# Patient Record
Sex: FEMALE | Race: WHITE | NOT HISPANIC OR LATINO | Employment: FULL TIME | URBAN - METROPOLITAN AREA
[De-identification: names, ages, dates, MRNs, and addresses within clinical notes are randomized per-mention and may not be internally consistent; named-entity substitution may affect disease eponyms.]

---

## 2017-01-16 ENCOUNTER — TELEPHONE (OUTPATIENT)
Dept: OBSTETRICS AND GYNECOLOGY | Facility: CLINIC | Age: 32
End: 2017-01-16

## 2017-01-16 NOTE — TELEPHONE ENCOUNTER
----- Message from Bailey Cruz MA sent at 1/16/2017  9:44 AM CST -----  Contact: pt   Do you have this IUD that was ordered 12/14/2016 for Alice?  ----- Message -----     From: Brittany Santiago     Sent: 1/16/2017   9:40 AM       To: [facility]ADD[facility] Staff    X_  1st Request  _  2nd Request  _  3rd Request    Who:CHARBEL DAVE [[id number]]    Why: Patient states she would like a call back to verify her medical records have been received     What Number to Call Back: [phone]    When to Expect a call back: (Before the end of the day)   -- if call after 3:00 call back will be tomorrow.

## 2017-01-18 ENCOUNTER — TELEPHONE (OUTPATIENT)
Dept: OBSTETRICS AND GYNECOLOGY | Facility: CLINIC | Age: 32
End: 2017-01-18

## 2017-01-18 NOTE — TELEPHONE ENCOUNTER
----- Message from Brittany Jack sent at 1/18/2017  9:24 AM CST -----  Contact: pt  _  1st Request  _  2nd Request  X_  3rd Request        Who: CHARBEL DAVE [[id number]]    Why: Patient states she would like to verify her records have been received. Patient also would like to check the status of her IUD order.    What Number to Call Back: [phone]    When to Expect a call back: (Before the end of the day)   -- if call after 3:00 call back will be tomorrow.    CARLEY

## 2017-01-30 DIAGNOSIS — B00.1 FEVER BLISTER: ICD-10-CM

## 2017-01-31 RX ORDER — VALACYCLOVIR HYDROCHLORIDE 1 G/1
TABLET, FILM COATED ORAL
Qty: 90 TABLET | Refills: 3 | Status: SHIPPED | OUTPATIENT
Start: 2017-01-31 | End: 2018-06-19 | Stop reason: SDUPTHER

## 2017-04-12 ENCOUNTER — TELEPHONE (OUTPATIENT)
Dept: OBSTETRICS AND GYNECOLOGY | Facility: CLINIC | Age: 32
End: 2017-04-12

## 2017-04-12 NOTE — TELEPHONE ENCOUNTER
----- Message from Jennifer Lombardi sent at 4/12/2017 10:15 AM CDT -----  Contact: Self  Pt is calling in regards of seeing if her IUD has came in and if so, she would like to schedule an appt. The pt can be reached at [phone]. Thanks KG

## 2017-04-27 ENCOUNTER — PATIENT MESSAGE (OUTPATIENT)
Dept: FAMILY MEDICINE | Facility: CLINIC | Age: 32
End: 2017-04-27

## 2017-05-09 ENCOUNTER — TELEPHONE (OUTPATIENT)
Dept: OBSTETRICS AND GYNECOLOGY | Facility: CLINIC | Age: 32
End: 2017-05-09

## 2017-05-09 NOTE — TELEPHONE ENCOUNTER
----- Message from Gin Badillo sent at 5/9/2017 10:12 AM CDT -----  Contact: pt   _ X 1st Request  _  2nd Request  _  3rd Request        Who: CHARBEL DAVE [[id number]]X    Why: pt is calling in regards to an IUD removal and replacement     What Number to Call Back: [phone]    When to Expect a call back: (Before the end of the day)   -- if the call is after 12:00, the call back will be tomorrow.        Call sent to Eva Banks MA. Elaine Robison NP

## 2017-05-24 ENCOUNTER — OFFICE VISIT (OUTPATIENT)
Dept: FAMILY MEDICINE | Facility: CLINIC | Age: 32
End: 2017-05-24
Payer: COMMERCIAL

## 2017-05-24 VITALS
HEIGHT: 66 IN | BODY MASS INDEX: 24.34 KG/M2 | DIASTOLIC BLOOD PRESSURE: 72 MMHG | HEART RATE: 60 BPM | WEIGHT: 151.44 LBS | TEMPERATURE: 98 F | SYSTOLIC BLOOD PRESSURE: 106 MMHG

## 2017-05-24 DIAGNOSIS — B35.4 TINEA CORPORIS: Primary | ICD-10-CM

## 2017-05-24 PROCEDURE — 99999 PR PBB SHADOW E&M-EST. PATIENT-LVL III: CPT | Mod: PBBFAC,,, | Performed by: FAMILY MEDICINE

## 2017-05-24 PROCEDURE — 99212 OFFICE O/P EST SF 10 MIN: CPT | Mod: S$GLB,,, | Performed by: FAMILY MEDICINE

## 2017-05-24 PROCEDURE — 1160F RVW MEDS BY RX/DR IN RCRD: CPT | Mod: S$GLB,,, | Performed by: FAMILY MEDICINE

## 2017-05-24 NOTE — PROGRESS NOTES
"Subjective:      Patient ID: Shannan Gayle is a 31 y.o. female.    Chief Complaint: Rash (back of left thigh)    Here today for a rash that developed on the left side of her leg for over a week, itchy.  She has tried over-the-counter topical antibiotic and steroid, but not much better.  She did have ringworm in the past but this looks different.  No drainage.  She does ride horses    Current Outpatient Prescriptions on File Prior to Visit   Medication Sig    valacyclovir (VALTREX) 1000 MG tablet 2 tabs by mouth every 12 hours for 3-5 days for fever blisters     No current facility-administered medications on file prior to visit.      Past Medical History:   Diagnosis Date    Fever blister 08/17/2016    Derm/Dr. Edgar Rosenberg 8/17/2016     No past surgical history on file.  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, single, no children, nonsmoker, ETOH socially, GYN here, enjoys intense cardiac exercise daily, running     Family History   Problem Relation Age of Onset    Autoimmune disease Father      in [place]    Hypertension Father     No Known Problems Mother     No Known Problems Brother     Colon cancer Neg Hx     Breast cancer Neg Hx      Vitals:    05/24/17 1440   BP: 106/72   Pulse: 60   Temp: 98 °F (36.7 °C)   Weight: 68.7 kg (151 lb 7.3 oz)   Height: 5' 6" (1.676 m)   PainSc:   2     Objective:   Physical Exam   Skin:   Circular rash on left lateral upper leg, elevated edges some flaking, pinkish in color, no drainage, nontender     Assessment:     1. Tinea corporis      Plan:          Patient Instructions   -----------------------  LAMISIL OR LOTRIMIN OVER THE COUNTER TWICE A DAY FOR SEVERAL WEEKS, can cover with bandaid for medicine to soak in     If not better, let me know &  consider referral to " Dermatologist  ------------------------        Answers for HPI/ROS submitted by the patient on 5/19/2017   Rash  Chronicity: new  Onset: in the past 7 days  Progression since onset: unchanged  Affected locations: left upper leg  Characteristics: itchiness  Exposed to: nothing  anorexia: No  congestion: No  cough: No  diarrhea: No  eye pain: No  facial edema: No  fatigue: No  fever: No  joint pain: No  nail changes: No  rhinorrhea: No  shortness of breath: No  sore throat: No  vomiting: No  Treatments tried: anti-itch cream, antibiotic cream, moisturizer  Improvement on treatment: mild  asthma: No  allergies: No  eczema: No  varicella: No

## 2017-05-24 NOTE — PATIENT INSTRUCTIONS
-----------------------  LAMISIL OR LOTRIMIN OVER THE COUNTER TWICE A DAY FOR SEVERAL WEEKS, can cover with bandaid for medicine to soak in     If not better, let me know &  consider referral to Dermatologist  ------------------------

## 2017-06-26 ENCOUNTER — PROCEDURE VISIT (OUTPATIENT)
Dept: OBSTETRICS AND GYNECOLOGY | Facility: CLINIC | Age: 32
End: 2017-06-26
Payer: COMMERCIAL

## 2017-06-26 VITALS — WEIGHT: 152.13 LBS | BODY MASS INDEX: 24.45 KG/M2 | HEIGHT: 66 IN

## 2017-06-26 DIAGNOSIS — Z30.433 ENCOUNTER FOR REMOVAL AND REINSERTION OF INTRAUTERINE CONTRACEPTIVE DEVICE (IUD): ICD-10-CM

## 2017-06-26 DIAGNOSIS — Z97.5 IUD CONTRACEPTION: Primary | ICD-10-CM

## 2017-06-26 DIAGNOSIS — Z97.5 INTRAUTERINE DEVICE: ICD-10-CM

## 2017-06-26 LAB
B-HCG UR QL: NEGATIVE
CTP QC/QA: YES

## 2017-06-26 PROCEDURE — 58300 INSERT INTRAUTERINE DEVICE: CPT | Mod: S$GLB,,, | Performed by: OBSTETRICS & GYNECOLOGY

## 2017-06-26 PROCEDURE — 88300 SURGICAL PATH GROSS: CPT | Mod: 26,,, | Performed by: PATHOLOGY

## 2017-06-26 PROCEDURE — 58301 REMOVE INTRAUTERINE DEVICE: CPT | Mod: 51,S$GLB,, | Performed by: OBSTETRICS & GYNECOLOGY

## 2017-06-26 PROCEDURE — 81025 URINE PREGNANCY TEST: CPT | Mod: QW,S$GLB,, | Performed by: OBSTETRICS & GYNECOLOGY

## 2017-06-26 PROCEDURE — 88300 SURGICAL PATH GROSS: CPT | Performed by: PATHOLOGY

## 2017-06-26 NOTE — PROGRESS NOTES
IUD PLACEMENT:    Shannan Gayle is a 31 y.o. female No obstetric history on file., who presents for IUD placement.  No LMP recorded..  UPT is negative.    PARAGARD HAD BEEN PLACED AT OUTSIDE PRACTICE, CAUSING ++ CRAMPING AND HEAVY MENSES - PREFERS REPLACEMENT WITH MIRENA IUD    Pre IUD Placement Counseling:  Contraceptive options were reviewed with the patient , and she chooses Mirena.  Her history was reviewed, and there are no contraindications to an IUD.  The procedure was explained, including minimal risks of pain, bleeding, uterine perforation, infection associated with insertion, and spontaneous expulsion within the first two weeks.  The benefits of contraception without systemic side effects and amenorrhea or hypomenorrhea were discussed.  The patient's questions were answered, and she agrees to proceed.  Hospital and device consent (if provided by ) were signed.    A speculum was placed within the vagina and the cervix was visualized.  STRINGS GRASPED WITH UT DRESSING FORCEPS, REMOVED WITHOUT DIFFICULTY, INTACT.  The cervix was cleaned with betadine swabs times three.  The anterior cervical lip was grasped with a single-tooth tenaculum, and the uterus was sounded using sterile technique to a depth of 7cm.  A Mirena IUD was loaded and placed high within the uterine funduswithout dificulty using a sterile technique.  The string was cut 2cm from the external cervical os.  The tenaculum and the speculum was removed.  The patient tolerated the procedure well.    Assessment:  Contraception management with an IUD insertion    Post IUD Insertion counseling:  The patient was counseled to manage post-IUD placement cramping with NSAIDs, Tylenol, or prescription per the medication card.  She was counseled to report excess bleeding, cramping that does not respond to the above medications, or fever greater than 101.0F.  The patient will maintain pelvic rest for the next week and return for a visit to  evaluate her for signs of possible endometritis.  She was instructed to check for IUD presence by feeling for the strings.  She was counseled regarding the need to remove the IUD in 5 years (Mirena) or 10 years (Paragard).  Counseling lasted approximately 15 minutes, and the patient had no questions at the end of her counseling.   '

## 2017-06-28 DIAGNOSIS — Z00.00 ANNUAL PHYSICAL EXAM: Primary | ICD-10-CM

## 2017-06-30 PROBLEM — Z97.5 INTRAUTERINE DEVICE: Status: ACTIVE | Noted: 2017-06-30

## 2017-07-26 ENCOUNTER — OFFICE VISIT (OUTPATIENT)
Dept: OBSTETRICS AND GYNECOLOGY | Facility: CLINIC | Age: 32
End: 2017-07-26
Payer: COMMERCIAL

## 2017-07-26 VITALS
BODY MASS INDEX: 24.1 KG/M2 | WEIGHT: 149.94 LBS | HEIGHT: 66 IN | DIASTOLIC BLOOD PRESSURE: 68 MMHG | SYSTOLIC BLOOD PRESSURE: 110 MMHG

## 2017-07-26 DIAGNOSIS — Z30.431 ENCOUNTER FOR ROUTINE CHECKING OF INTRAUTERINE CONTRACEPTIVE DEVICE (IUD): ICD-10-CM

## 2017-07-26 DIAGNOSIS — Z01.419 VISIT FOR GYNECOLOGIC EXAMINATION: Primary | ICD-10-CM

## 2017-07-26 PROCEDURE — 99999 PR PBB SHADOW E&M-EST. PATIENT-LVL II: CPT | Mod: PBBFAC,,, | Performed by: OBSTETRICS & GYNECOLOGY

## 2017-07-26 PROCEDURE — 88175 CYTOPATH C/V AUTO FLUID REDO: CPT

## 2017-07-26 PROCEDURE — 99385 PREV VISIT NEW AGE 18-39: CPT | Mod: S$GLB,,, | Performed by: OBSTETRICS & GYNECOLOGY

## 2017-07-26 NOTE — PROGRESS NOTES
HISTORY OF PRESENT ILLNESS:    Shannan Gayle is a 32 y.o. female, No obstetric history on file., No LMP recorded.,  presents for a routine exam and has no complaints. Also IUD check, placed 3 weeks ago. PAP SUBMITTED AND DISCUSSED RECENT CHANGES IN SCREENING RECOMMENDATIONS.    BUSY AT WORK,HAS MOVED TO [facility], PLANS MORE SHOWING THIS YEAR AND POSS TAKING A YEAR OFF FROM [email or web address] . .     Past Medical History:   Diagnosis Date    Fever blister 08/17/2016    Derm/Dr. Edgar Rosenberg 8/17/2016       History reviewed. No pertinent surgical history.    MEDICATIONS AND ALLERGIES:      Current Outpatient Prescriptions:     valacyclovir (VALTREX) 1000 MG tablet, 2 tabs by mouth every 12 hours for 3-5 days for fever blisters, Disp: 90 tablet, Rfl: 3    Review of patient's allergies indicates:  No Known Allergies    Family History   Problem Relation Age of Onset    Autoimmune disease Father      in [place]    Hypertension Father     No Known Problems Mother     No Known Problems Brother     Colon cancer Neg Hx     Breast cancer Neg Hx        Social History     Social History    Marital status: Single     Spouse name: N/A    Number of children: N/A    Years of education: N/A     Occupational History    Not on file.     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  Infection History: Denies STDs. Denies PID.  Benign History: Denies uterine fibroids. Denies ovarian cysts. Denies  "endometriosis. Denies other conditions.  Cancer History: Denies cervical cancer. Denies uterine cancer or hyperplasia. Denies ovarian cancer. Denies vulvar cancer or pre-cancer. Denies vaginal cancer or pre-cancer. Denies breast cancer. Denies colon cancer.  Sexual Activity History: Reports currently being sexually active  Menstrual History: Monthly, mild-moderate.  Contraception:IUD    ROS:  GENERAL: No weight changes. No swelling. No fatigue. No fever.  CARDIOVASCULAR: No chest pain. No shortness of breath. No leg cramps.   NEUROLOGICAL: No headaches. No vision changes.  BREASTS: No pain. No lumps. No discharge.  ABDOMEN: No pain. No nausea. No vomiting. No diarrhea. No constipation.  REPRODUCTIVE: No abnormal bleeding.   VULVA: No pain. No lesions. No itching.  VAGINA: No relaxation. No itching. No odor. No discharge. No lesions.  URINARY: No incontinence. No nocturia. No frequency. No dysuria.    /68   Ht 5' 6" (1.676 m)   Wt 68 kg (149 lb 14.6 oz)   BMI 24.20 kg/m²     PE:  APPEARANCE: Well nourished, well developed, in no acute distress.  AFFECT: WNL, alert and oriented x 3.  SKIN: No acne or hirsutism.  NECK: Neck symmetric, without masses or thyromegaly.  NODES: No inguinal, cervical, axillary or femoral lymph node enlargement.  CHEST: Good respiratory effort.   ABDOMEN: Soft. No tenderness or masses. No hepatosplenomegaly. No hernias.  BREASTS: Symmetrical, no skin changes, visible lesions, palpable masses or nipple discharge bilaterally.  PELVIC: External female genitalia without lesions.  Female hair distribution. Adequate perineal body, Normal urethral meatus. Vagina moist and well rugated without lesions or discharge.  No significant cystocele or rectocele present. Cervix pink without lesions, discharge or tenderness, STRING AT THE EXTERNAL OS. Uterus is normal size, regular, mobile and nontender. Adnexa without masses or tenderness.  EXTREMITIES: No edema    PROCEDURES:  Pap    DIAGNOSIS:  1. " Visit for gynecologic examination  Liquid-based pap smear, screening   2. Encounter for routine checking of intrauterine contraceptive device (IUD)         LABS AND TESTS ORDERED:    MEDICATIONS PRESCRIBED:    COUNSELING:   The patient was counseled today on ACS PAP guidelines, with recommendations for yearly pelvic exams unless their uterus, cervix, and ovaries were removed for benign reasons; in that case, examinations every 3-5 years are recommended.  The patient was also counseled regarding monthly breast self-examination, routine STD screening for at-risk populations, prophylactic immunizations for transmitted infections such as  HPV, Pertussis, or Influenza as appropriate, and yearly mammograms when indicated by ACS guidelines.  She was advised to see her primary care physician for all other health maintenance.    FOLLOW-UP with me annually.

## 2017-08-05 ENCOUNTER — PATIENT MESSAGE (OUTPATIENT)
Dept: OBSTETRICS AND GYNECOLOGY | Facility: HOSPITAL | Age: 32
End: 2017-08-05

## 2017-09-22 ENCOUNTER — LAB VISIT (OUTPATIENT)
Dept: LAB | Facility: HOSPITAL | Age: 32
End: 2017-09-22
Attending: FAMILY MEDICINE
Payer: COMMERCIAL

## 2017-09-22 DIAGNOSIS — Z00.00 ANNUAL PHYSICAL EXAM: ICD-10-CM

## 2017-09-22 LAB
ALBUMIN SERPL BCP-MCNC: 4 G/DL
ALP SERPL-CCNC: 68 U/L
ALT SERPL W/O P-5'-P-CCNC: 11 U/L
ANION GAP SERPL CALC-SCNC: 9 MMOL/L
AST SERPL-CCNC: 20 U/L
BASOPHILS # BLD AUTO: 0.02 K/UL
BASOPHILS NFR BLD: 0.3 %
BILIRUB SERPL-MCNC: 0.5 MG/DL
BUN SERPL-MCNC: 14 MG/DL
CALCIUM SERPL-MCNC: 9 MG/DL
CHLORIDE SERPL-SCNC: 107 MMOL/L
CHOLEST SERPL-MCNC: 223 MG/DL
CHOLEST/HDLC SERPL: 3.4 {RATIO}
CO2 SERPL-SCNC: 27 MMOL/L
CREAT SERPL-MCNC: 0.9 MG/DL
DIFFERENTIAL METHOD: NORMAL
EOSINOPHIL # BLD AUTO: 0.1 K/UL
EOSINOPHIL NFR BLD: 2.1 %
ERYTHROCYTE [DISTWIDTH] IN BLOOD BY AUTOMATED COUNT: 13 %
EST. GFR  (AFRICAN AMERICAN): >60 ML/MIN/1.73 M^2
EST. GFR  (NON AFRICAN AMERICAN): >60 ML/MIN/1.73 M^2
GLUCOSE SERPL-MCNC: 97 MG/DL
HCT VFR BLD AUTO: 40 %
HDLC SERPL-MCNC: 65 MG/DL
HDLC SERPL: 29.1 %
HGB BLD-MCNC: 14 G/DL
LDLC SERPL CALC-MCNC: 145 MG/DL
LYMPHOCYTES # BLD AUTO: 2 K/UL
LYMPHOCYTES NFR BLD: 30.1 %
MCH RBC QN AUTO: 29.7 PG
MCHC RBC AUTO-ENTMCNC: 35 G/DL
MCV RBC AUTO: 85 FL
MONOCYTES # BLD AUTO: 0.4 K/UL
MONOCYTES NFR BLD: 6.5 %
NEUTROPHILS # BLD AUTO: 4 K/UL
NEUTROPHILS NFR BLD: 60.7 %
NONHDLC SERPL-MCNC: 158 MG/DL
PLATELET # BLD AUTO: 230 K/UL
PMV BLD AUTO: 10.1 FL
POTASSIUM SERPL-SCNC: 4.2 MMOL/L
PROT SERPL-MCNC: 7.2 G/DL
RBC # BLD AUTO: 4.71 M/UL
SODIUM SERPL-SCNC: 143 MMOL/L
TRIGL SERPL-MCNC: 65 MG/DL
TSH SERPL DL<=0.005 MIU/L-ACNC: 1.68 UIU/ML
WBC # BLD AUTO: 6.58 K/UL

## 2017-09-22 PROCEDURE — 84443 ASSAY THYROID STIM HORMONE: CPT

## 2017-09-22 PROCEDURE — 80061 LIPID PANEL: CPT

## 2017-09-22 PROCEDURE — 36415 COLL VENOUS BLD VENIPUNCTURE: CPT | Mod: PO

## 2017-09-22 PROCEDURE — 85025 COMPLETE CBC W/AUTO DIFF WBC: CPT

## 2017-09-22 PROCEDURE — 80053 COMPREHEN METABOLIC PANEL: CPT

## 2017-09-28 ENCOUNTER — OFFICE VISIT (OUTPATIENT)
Dept: FAMILY MEDICINE | Facility: CLINIC | Age: 32
End: 2017-09-28
Payer: COMMERCIAL

## 2017-09-28 VITALS
HEART RATE: 72 BPM | DIASTOLIC BLOOD PRESSURE: 70 MMHG | SYSTOLIC BLOOD PRESSURE: 89 MMHG | TEMPERATURE: 98 F | BODY MASS INDEX: 23.42 KG/M2 | WEIGHT: 145.75 LBS | HEIGHT: 66 IN

## 2017-09-28 DIAGNOSIS — B00.1 FEVER BLISTER: ICD-10-CM

## 2017-09-28 DIAGNOSIS — Z00.00 ANNUAL PHYSICAL EXAM: Primary | ICD-10-CM

## 2017-09-28 DIAGNOSIS — Z28.39 IMMUNIZATION DEFICIENCY: ICD-10-CM

## 2017-09-28 PROCEDURE — 99999 PR PBB SHADOW E&M-EST. PATIENT-LVL III: CPT | Mod: PBBFAC,,, | Performed by: FAMILY MEDICINE

## 2017-09-28 PROCEDURE — 99395 PREV VISIT EST AGE 18-39: CPT | Mod: S$GLB,,, | Performed by: FAMILY MEDICINE

## 2017-09-28 NOTE — PATIENT INSTRUCTIONS
Due TDAP & FLU        ==============================================================    Follow with GYN    RECOMMENDATIONS FOR FEMALES    Prevent the #1 cause of death- cardiovascular disease (HEART ATTACK & STROKE) by checking for normal blood pressure, cholesterol, sugars, & by not smoking.     VACCINES: Yearly FLU shot,     I recommend  high fiber (5 fresh fruits or vegetables daily), low fat diet and aerobic  exercise (huffing/ puffing/ sweating for 20 min straight at least 4 days a week)    Follow up yearly with  fasting lipids, CMP, CBC, TSH prior.   ==============================================================

## 2017-09-28 NOTE — PROGRESS NOTES
Subjective:      Patient ID: Shannan Gayle is a 32 y.o. female.    Chief Complaint: Annual Exam    Here today for general exam.     She follows with gynecologist for female cancer screening    She enjoys riding horses    No flare ups of fever blisters recently.     Denies any chest pain, shortness of breath, nausea vomiting constipation diarrhea, blood in stool, heartburn      No results found for: HGBA1C  No results found for: MICALBCREAT  Lab Results   Component Value Date    LDLCALC 145.0 09/22/2017    LDLCALC 132.4 10/19/2015    CHOL 223 (H) 09/22/2017    HDL 65 09/22/2017    TRIG 65 09/22/2017       Lab Results   Component Value Date     09/22/2017    K 4.2 09/22/2017     09/22/2017    CO2 27 09/22/2017    GLU 97 09/22/2017    BUN 14 09/22/2017    CREATININE 0.9 09/22/2017    CALCIUM 9.0 09/22/2017    PROT 7.2 09/22/2017    ALBUMIN 4.0 09/22/2017    BILITOT 0.5 09/22/2017    ALKPHOS 68 09/22/2017    AST 20 09/22/2017    ALT 11 09/22/2017    ANIONGAP 9 09/22/2017    ESTGFRAFRICA >60.0 09/22/2017    EGFRNONAA >60.0 09/22/2017    WBC 6.58 09/22/2017    HGB 14.0 09/22/2017    HCT 40.0 09/22/2017    MCV 85 09/22/2017     09/22/2017    TSH 1.679 09/22/2017    [id number] 43 05/31/2013         Current Outpatient Prescriptions on File Prior to Visit   Medication Sig    valacyclovir (VALTREX) 1000 MG tablet 2 tabs by mouth every 12 hours for 3-5 days for fever blisters     No current facility-administered medications on file prior to visit.      Past Medical History:   Diagnosis Date    Fever blister 08/17/2016    Derm/Dr. Edgar Rosenberg 8/17/2016     No past surgical history on file.  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 She appears well-developed and well-nourished.   HENT:   Head: Normocephalic and atraumatic.   Right Ear: External ear normal.   Left Ear: External ear normal.   Nose: Nose normal.   Mouth/Throat: Oropharynx is clear and moist.   Eyes: EOM are normal. Pupils are equal, round, and reactive to light.   Neck: Neck supple. No thyromegaly present.   Cardiovascular: Normal rate, regular rhythm, normal heart sounds and intact distal pulses.    No murmur heard.  Pulmonary/Chest: Effort normal and breath sounds normal. She has no wheezes.   Abdominal: Soft. Bowel sounds are normal. She exhibits no distension and no mass. There is no tenderness. There is no rebound and no guarding.   Musculoskeletal: She exhibits no edema.   Lymphadenopathy:     She has no cervical adenopathy.   Neurological: She is alert and oriented to person, place, and time.   Skin: Skin is warm and dry.   Psychiatric: She has a normal mood and affect. Her behavior is normal.     Assessment:     1. general exam    2. Immunization deficiency    3. Fever blister      Plan:     Orders Placed This Encounter    (In Office Administered) Tdap Vaccine   declines    Patient Instructions   Due TDAP & FLU        ==============================================================    Follow with GYN    RECOMMENDATIONS FOR FEMALES    Prevent the #1 cause of death- cardiovascular disease (HEART ATTACK & STROKE) by checking for normal blood pressure, cholesterol, sugars, & by not smoking.     VACCINES: Yearly FLU shot, "     I recommend  high fiber (5 fresh fruits or vegetables daily), low fat diet and aerobic  exercise (huffing/ puffing/ sweating for 20 min straight at least 4 days a week)    Follow up yearly with  fasting lipids, CMP, CBC, TSH prior.   ==============================================================

## 2017-10-06 ENCOUNTER — PATIENT MESSAGE (OUTPATIENT)
Dept: FAMILY MEDICINE | Facility: CLINIC | Age: 32
End: 2017-10-06

## 2017-10-12 ENCOUNTER — PATIENT MESSAGE (OUTPATIENT)
Dept: FAMILY MEDICINE | Facility: CLINIC | Age: 32
End: 2017-10-12

## 2018-02-08 ENCOUNTER — OFFICE VISIT (OUTPATIENT)
Dept: OBSTETRICS AND GYNECOLOGY | Facility: CLINIC | Age: 33
End: 2018-02-08
Payer: COMMERCIAL

## 2018-02-08 VITALS — WEIGHT: 147 LBS | HEIGHT: 66 IN | BODY MASS INDEX: 23.63 KG/M2

## 2018-02-08 DIAGNOSIS — Z11.3 VENEREAL DISEASE SCREENING: Primary | ICD-10-CM

## 2018-02-08 PROCEDURE — 99213 OFFICE O/P EST LOW 20 MIN: CPT | Mod: S$GLB,,, | Performed by: OBSTETRICS & GYNECOLOGY

## 2018-02-08 PROCEDURE — 87491 CHLMYD TRACH DNA AMP PROBE: CPT

## 2018-02-08 PROCEDURE — 99999 PR PBB SHADOW E&M-EST. PATIENT-LVL II: CPT | Mod: PBBFAC,,, | Performed by: OBSTETRICS & GYNECOLOGY

## 2018-02-08 PROCEDURE — 99212 OFFICE O/P EST SF 10 MIN: CPT | Performed by: OBSTETRICS & GYNECOLOGY

## 2018-02-08 PROCEDURE — 87480 CANDIDA DNA DIR PROBE: CPT

## 2018-02-08 PROCEDURE — 3008F BODY MASS INDEX DOCD: CPT | Mod: S$GLB,,, | Performed by: OBSTETRICS & GYNECOLOGY

## 2018-02-08 NOTE — PROGRESS NOTES
HISTORY OF PRESENT ILLNESS:    Shannan Gayle is a 32 y.o. female, No obstetric history on file., No LMP recorded. Patient is not currently having periods (Reason: Birth Control).,  presents for a problem visit, complaining of NEEDS INFECTION SCREENING FOR PEACE OF MIND    Past Medical History:   Diagnosis Date    Fever blister 08/17/2016    Derm/Dr. Edgar Rosenberg 8/17/2016       History reviewed. No pertinent surgical history.    MEDICATIONS AND ALLERGIES:      Current Outpatient Prescriptions:     valacyclovir (VALTREX) 1000 MG tablet, 2 tabs by mouth every 12 hours for 3-5 days for fever blisters, Disp: 90 tablet, Rfl: 3    Review of patient's allergies indicates:  No Known Allergies    Family History   Problem Relation Age of Onset    Autoimmune disease Father      in [place]    Hypertension Father     No Known Problems Mother     No Known Problems Brother     Colon cancer Neg Hx     Breast cancer Neg Hx        Social History     Social History    Marital status: Single     Spouse name: N/A    Number of children: N/A    Years of education: N/A     Occupational History    Not on file.     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  Infection History: Denies STDs. Denies PID.  Benign History: Denies uterine fibroids. Denies ovarian cysts. Denies endometriosis. Denies other conditions.  Cancer History: Denies cervical cancer. Denies uterine cancer or hyperplasia. Denies ovarian  "cancer. Denies vulvar cancer or pre-cancer. Denies vaginal cancer or pre-cancer. Denies breast cancer. Denies colon cancer.    ROS:  GENERAL: No weight changes. No swelling. No fatigue. No fever.  CARDIOVASCULAR: No chest pain. No shortness of breath. No leg cramps.   NEUROLOGICAL: No headaches. No vision changes.  BREASTS: No pain. No lumps. No discharge.  ABDOMEN: No pain. No nausea. No vomiting. No diarrhea. No constipation.  REPRODUCTIVE: No abnormal bleeding.   VULVA: No pain. No lesions. No itching.  VAGINA: No relaxation. No itching. No odor. No discharge. No lesions.  URINARY: No incontinence. No nocturia. No frequency. No dysuria.    Ht 5' 6" (1.676 m)   Wt 66.7 kg (147 lb)   BMI 23.73 kg/m²     PE:  APPEARANCE: Well nourished, well developed, in no acute distress.  ABDOMEN: Soft. No tenderness or masses. No hepatosplenomegaly. No hernias.  BREASTS, FUNDOSCOPIC, RECTAL DEFERRED  PELVIC: External female genitalia without lesions.  Female hair distribution. Adequate perineal body, Normal urethral meatus. Vagina moist and well rugated without lesions or discharge.  No significant cystocele or rectocele present. Cervix pink without lesions, discharge or tenderness, STRING AT EXTERNAL OS. Uterus is normal size, regular, mobile and nontender. Adnexa without masses or tenderness.  EXTREMITIES: No edema    PROCEDURES:    DIAGNOSIS:  1. Venereal disease screening  HIV-1 and HIV-2 antibodies    RPR    Hepatitis B surface antigen    C. trachomatis/N. gonorrhoeae by AMP DNA Cervicovaginal    Vaginosis Screen by DNA Probe       LABS AND TESTS ORDERED:    MEDICATIONS PRESCRIBED:    COUNSELING:   The patient was counseled on the options for TESTING AND treatment IF NEEDED    FOLLOW-UP with me routine visit.       Answers for HPI/ROS submitted by the patient on 2/6/2018   STD exposure  discharge: No  genital itching: No  genital rash: No  dyspareunia: No  genital lesions: No  pelvic pain: No  dysuria: No  Please select " the characteristics of your discharge: : normal  abdominal pain: No  anorexia: No  diaphoresis: No  fever: No  genital odor: No  rectal pain: No  sore throat: No  frequency: No  treatments tried: nothing

## 2018-02-09 LAB
C TRACH DNA SPEC QL NAA+PROBE: NOT DETECTED
CANDIDA RRNA VAG QL PROBE: NEGATIVE
G VAGINALIS RRNA GENITAL QL PROBE: POSITIVE
N GONORRHOEA DNA SPEC QL NAA+PROBE: NOT DETECTED
T VAGINALIS RRNA GENITAL QL PROBE: NEGATIVE

## 2018-02-11 ENCOUNTER — PATIENT MESSAGE (OUTPATIENT)
Dept: OBSTETRICS AND GYNECOLOGY | Facility: CLINIC | Age: 33
End: 2018-02-11

## 2018-02-11 RX ORDER — METRONIDAZOLE 7.5 MG/G
1 GEL VAGINAL DAILY
Qty: 70 G | Refills: 0 | Status: SHIPPED | OUTPATIENT
Start: 2018-02-11 | End: 2018-02-16

## 2018-02-11 NOTE — TELEPHONE ENCOUNTER
MESSAGE TO PATIENT:  Results of your cervical culture shows bacterial inflammation (BV) within the vagina but no evidence of a transmitted infection.  It will require treatment only if you have symptoms of vaginal irritation or burning, and your partner will not need treatment.  It is associated frequently with exposure to a perfume or additive to soaps, so I recommend you choose unscented products   I will send a prescription for Metrogel to your pharmacy on file.  Please contact me at the office if you have any questions or concerns.

## 2018-04-14 ENCOUNTER — PATIENT MESSAGE (OUTPATIENT)
Dept: FAMILY MEDICINE | Facility: CLINIC | Age: 33
End: 2018-04-14

## 2018-06-13 ENCOUNTER — PATIENT MESSAGE (OUTPATIENT)
Dept: FAMILY MEDICINE | Facility: CLINIC | Age: 33
End: 2018-06-13

## 2018-06-19 DIAGNOSIS — B00.1 FEVER BLISTER: ICD-10-CM

## 2018-06-19 RX ORDER — VALACYCLOVIR HYDROCHLORIDE 1 G/1
TABLET, FILM COATED ORAL
Qty: 90 TABLET | Refills: 0 | Status: SHIPPED | OUTPATIENT
Start: 2018-06-19 | End: 2018-10-02 | Stop reason: SDUPTHER

## 2018-07-03 ENCOUNTER — OFFICE VISIT (OUTPATIENT)
Dept: URGENT CARE | Facility: CLINIC | Age: 33
End: 2018-07-03
Payer: COMMERCIAL

## 2018-07-03 VITALS
WEIGHT: 147 LBS | TEMPERATURE: 98 F | OXYGEN SATURATION: 99 % | SYSTOLIC BLOOD PRESSURE: 116 MMHG | HEART RATE: 81 BPM | HEIGHT: 66 IN | RESPIRATION RATE: 18 BRPM | DIASTOLIC BLOOD PRESSURE: 81 MMHG | BODY MASS INDEX: 23.63 KG/M2
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
## 2018-07-03 DIAGNOSIS — S91.051A DOG BITE OF RIGHT ANKLE, INITIAL ENCOUNTER: ICD-10-CM

## 2018-07-03 DIAGNOSIS — T14.8XXA PUNCTURE WOUND: ICD-10-CM

## 2018-07-03 DIAGNOSIS — S91.011A LACERATION OF RIGHT ANKLE, INITIAL ENCOUNTER: Primary | ICD-10-CM

## 2018-07-03 PROCEDURE — 90715 TDAP VACCINE 7 YRS/> IM: CPT | Mod: S$GLB,,, | Performed by: FAMILY MEDICINE

## 2018-07-03 PROCEDURE — 99203 OFFICE O/P NEW LOW 30 MIN: CPT | Mod: 25,S$GLB,, | Performed by: FAMILY MEDICINE

## 2018-07-03 PROCEDURE — 3008F BODY MASS INDEX DOCD: CPT | Mod: CPTII,S$GLB,, | Performed by: FAMILY MEDICINE

## 2018-07-03 PROCEDURE — 90471 IMMUNIZATION ADMIN: CPT | Mod: S$GLB,,, | Performed by: FAMILY MEDICINE

## 2018-07-03 RX ORDER — MUPIROCIN 20 MG/G
OINTMENT TOPICAL
Qty: 22 G | Refills: 1 | Status: SHIPPED | OUTPATIENT
Start: 2018-07-03 | End: 2020-10-13

## 2018-07-03 RX ORDER — AMOXICILLIN AND CLAVULANATE POTASSIUM 875; 125 MG/1; MG/1
1 TABLET, FILM COATED ORAL 2 TIMES DAILY
Qty: 20 TABLET | Refills: 0 | Status: SHIPPED | OUTPATIENT
Start: 2018-07-03 | End: 2018-07-13

## 2018-07-03 NOTE — PATIENT INSTRUCTIONS
Dog Bite (Child)  Dog bites can cause small puncture wounds or serious injuries. The area may swell and be painful. It may also bleed and seep fluid. Dog bites that reach the bone can cause a fracture. The bites can also damage nerves and blood vessels. An infection from a dog bite is rare, but can cause redness, swelling, pain, and fever. In rare cases, the animal can pass a disease like rabies or tetanus through the bite.  Dog bites are treated by first rinsing the wound with saline or sterile water. The skin is washed with a mild soap and warm water. If needed, the wound is closed with stitches (sutures). A clean pressure dressing may then be applied. A tetanus shot may be needed, especially if the childs last shot was more than 5 years ago. An X-ray may also be needed. If its not known if the dog was vaccinated, rabies protocol may be followed. This involves keeping the dog isolated (quarantined) and giving the child a series of rabies shots. If the wound is severe or infected, a hospital stay may be needed.  An antibiotic cream or ointment or oral antibiotics may be prescribed. These help prevent or treat infection. Follow all instructions when applying or giving this medicine to your child.  Home care  General care  · Wash your hands well with soap and warm water before and after caring for the wound. This helps lower the risk of infection.  · Follow instructions on how to care for the wound. This may involve cleaning the wound with gentle soap and warm water. If a dressing was applied to the wound, be sure to change it as directed.  · If the wound bleeds, place a clean, soft cloth on the wound. Then firmly apply pressure until the bleeding stops. This may take up to 5 minutes. Do not release the pressure and look at the wound during this time.  · Check the wound daily for signs of infection (see section below on seeking medical advice).  Prevention  Dogs usually dont bite unless they are teased or  threatened. At times, dogs bite during play. Small children are easy targets for dog bites. They move quickly and unpredictably. Also, children often dont know how to be gentle with animals.  · Keep babies away from all pets. Even a friendly dog may not understand that a baby is not a toy or prey.  · Teach your child how to treat animals gently and with respect. This includes not approaching strange dogs or teasing dogs. Have your child ask the owner for permission before petting a strange dog.  · Teach your child to never bother a dog that is eating, sleeping, or caring for puppies.  · If you are thinking about getting a family pet, get advice from a vet about breeds that are best with children.  · If you bring a dog into your home, train the dog to be obedient and listen to commands. You can have older children help with the training.  Follow-up care  Follow up with your childs healthcare provider, or as advised.  When to seek medical advice  Call your childs healthcare provider right away if your child has any of the following:  · A fever of 100.4°F (38°C) or higher, or as directed by the provider  · Signs of infection around the wound, such as warmth, redness, swelling, or foul-smelling drainage.  · Pain that gets worse. Babies may show pain as crying or fussing that cant be soothed.  · Bleeding that doesnt stop after 5 minutes of firm pressure.  · Trouble moving any body part near the wound.  Date Last Reviewed: 3/1/2017  © 8040-3350 [employer]. [address] [place] [address], [place] [address]. All rights reserved. This information is not intended as a substitute for professional medical care. Always follow your healthcare professional's instructions.

## 2018-07-03 NOTE — PROGRESS NOTES
"Subjective:       Patient ID: Shannan Gayle is a 32 y.o. female.    Vitals:  height is 5' 6" (1.676 m) and weight is 66.7 kg (147 lb). Her oral temperature is 97.9 °F (36.6 °C). Her blood pressure is 116/81 and her pulse is 81. Her respiration is 18 and oxygen saturation is 99%.     Chief Complaint: Puncture Wound    Ankle Injury    The incident occurred less than 1 hour ago. [place] Injury mechanism: puncture wound from dog tooth. The pain is present in the right ankle. The pain is at a severity of 0/10. The patient is experiencing no pain. Pertinent negatives include no numbness. She reports no foreign bodies present. She has tried nothing for the symptoms.     Review of Systems   Constitution: Negative for weakness and malaise/fatigue.   HENT: Negative for nosebleeds.    Cardiovascular: Negative for chest pain and syncope.   Respiratory: Negative for shortness of breath.    Musculoskeletal: Negative for back pain, joint pain and neck pain.   Gastrointestinal: Negative for abdominal pain.   Genitourinary: Negative for hematuria.   Neurological: Negative for dizziness and numbness.       Objective:      Physical Exam   Constitutional: She is oriented to person, place, and time. She appears well-developed and well-nourished. She is cooperative.  Non-toxic appearance. She does not appear ill. No distress.   HENT:   Head: Normocephalic and atraumatic. Head is without abrasion, without contusion and without laceration.   Right Ear: Hearing, tympanic membrane and ear canal normal. No hemotympanum.   Left Ear: Hearing, tympanic membrane and ear canal normal. No hemotympanum.   Nose: Nose normal. No mucosal edema, rhinorrhea or nasal deformity. No epistaxis. Right sinus exhibits no maxillary sinus tenderness and no frontal sinus tenderness. Left sinus exhibits no maxillary sinus tenderness and no frontal sinus tenderness.   Mouth/Throat: Uvula is midline and mucous membranes are normal. No " trismus in the jaw. Normal dentition. No uvula swelling. No posterior oropharyngeal erythema.   Eyes: Conjunctivae, EOM and lids are normal. Right eye exhibits no discharge. Left eye exhibits no discharge. No scleral icterus.   Sclera clear bilat   Neck: Trachea normal, normal range of motion, full passive range of motion without pain and phonation normal. Neck supple. No spinous process tenderness and no muscular tenderness present. No neck rigidity. No tracheal deviation present.   Cardiovascular: Normal rate, intact distal pulses and normal pulses.    Pulmonary/Chest: Effort normal. No respiratory distress.   Abdominal: Normal appearance. She exhibits no distension, no pulsatile midline mass and no mass. There is no tenderness.   Musculoskeletal: Normal range of motion. She exhibits no edema or deformity.   Neurological: She is alert and oriented to person, place, and time. She has normal strength. No cranial nerve deficit or sensory deficit. She exhibits normal muscle tone. She displays no seizure activity. Coordination normal. GCS eye subscore is 4. GCS verbal subscore is 5. GCS motor subscore is 6.   Skin: Skin is warm and dry. Capillary refill takes less than 2 seconds. Laceration (smal 0.5cm lac with subcutaneous tissue visible) noted. No abrasion, no bruising, no burn and no ecchymosis noted. She is not diaphoretic. No pallor.        Psychiatric: She has a normal mood and affect. Her speech is normal and behavior is normal. Judgment and thought content normal. Cognition and memory are normal.   Nursing note and vitals reviewed.      Assessment:       1. Laceration of right ankle, initial encounter    2. Puncture wound    3. Dog bite of right ankle, initial encounter        Plan:         Laceration of right ankle, initial encounter    Puncture wound    Dog bite of right ankle, initial encounter    Other orders  -     (In Office Administered) Tdap Vaccine  -     amoxicillin-clavulanate 875-125mg (AUGMENTIN)  875-125 mg per tablet; Take 1 tablet by mouth 2 (two) times daily. for 10 days  Dispense: 20 tablet; Refill: 0  -     mupirocin (BACTROBAN) 2 % ointment; Apply to affected area 3 times daily  Dispense: 22 g; Refill: 1

## 2018-07-03 NOTE — PROCEDURES
Excision of Lesion  Date/Time: 7/3/2018 5:28 PM  Performed by: AMY GIBBONS  Authorized by: AMY GIBOBNS       ANESTHESIA:  Local anesthetic:  Topical anesthetic    PROCEDURE DETAILS:  Excision type:  Skin  Malignancy:  Benign   Removed protruding subcutaneous fat from wound. With scissors. Hemostasis achieved. Wound clean vigorously with betadine/saline

## 2018-07-06 ENCOUNTER — TELEPHONE (OUTPATIENT)
Dept: URGENT CARE | Facility: CLINIC | Age: 33
End: 2018-07-06

## 2018-07-09 DIAGNOSIS — Z00.00 ANNUAL PHYSICAL EXAM: Primary | ICD-10-CM

## 2018-07-16 ENCOUNTER — OFFICE VISIT (OUTPATIENT)
Dept: OBSTETRICS AND GYNECOLOGY | Facility: CLINIC | Age: 33
End: 2018-07-16
Payer: COMMERCIAL

## 2018-07-16 VITALS — BODY MASS INDEX: 23.03 KG/M2 | HEIGHT: 66 IN | WEIGHT: 143.31 LBS

## 2018-07-16 DIAGNOSIS — Z01.419 VISIT FOR GYNECOLOGIC EXAMINATION: Primary | ICD-10-CM

## 2018-07-16 DIAGNOSIS — Z30.431 ENCOUNTER FOR ROUTINE CHECKING OF INTRAUTERINE CONTRACEPTIVE DEVICE (IUD): ICD-10-CM

## 2018-07-16 DIAGNOSIS — L72.9 FOLLICULAR CYST OF THE SKIN AND SUBCUTANEOUS TISSUE, UNSPECIFIED: ICD-10-CM

## 2018-07-16 PROCEDURE — 99395 PREV VISIT EST AGE 18-39: CPT | Mod: S$GLB,,, | Performed by: OBSTETRICS & GYNECOLOGY

## 2018-07-16 PROCEDURE — 99999 PR PBB SHADOW E&M-EST. PATIENT-LVL II: CPT | Mod: PBBFAC,,, | Performed by: OBSTETRICS & GYNECOLOGY

## 2018-07-16 NOTE — PROGRESS NOTES
HISTORY OF PRESENT ILLNESS:    Shannan Gayle is a 33 y.o. female, No obstetric history on file., Patient's last menstrual period was 06/15/2018.,  presents for a routine exam and has no complaints OTHER THAN VULVAR PAIN - HAS TWO SMALL DERMAL CYSTS THAT ARE MORE INFLAMED WITH EXERCISE IN THE HEAT; HAS BEEN SHAVING BUT TRYING TO LIMIT FREQUENCY.  ON EXAM TINY 1 CM AND .5 CM DEEP DERMAL CYSTS LEFT ANTERIOR VULVA, NO CELLULITIS OR ERYTHEMA.  COUNSELED RE LOCAL CARE; WOULD NOT RECOMMEND I&D UNLESS THEY PROGRESS SYMPTOMATICALLY OR PERSIST EXCESSIVE TIME.  PAP DUE 2020    LAST ROUTINE VISIT 7/2017:  PAP SUBMITTED AND DISCUSSED RECENT CHANGES IN SCREENING RECOMMENDATIONS.    BUSY AT WORK,HAS MOVED TO [facility], PLANS MORE SHOWING THIS YEAR AND POSS TAKING A YEAR OFF FROM [employer] .     Past Medical History:   Diagnosis Date    Fever blister 08/17/2016    Derm/Dr. Edgar Rosenberg 8/17/2016       History reviewed. No pertinent surgical history.    MEDICATIONS AND ALLERGIES:      Current Outpatient Prescriptions:     mupirocin (BACTROBAN) 2 % ointment, Apply to affected area 3 times daily, Disp: 22 g, Rfl: 1    valACYclovir (VALTREX) 1000 MG tablet, 2 TABS BY MOUTH EVERY 12 HOURS FOR 3-5 DAYS FOR FEVER BLISTERS, Disp: 90 tablet, Rfl: 0    Review of patient's allergies indicates:  No Known Allergies    Family History   Problem Relation Age of Onset    Autoimmune disease [place]    Hypertension Father     No Known Problems Mother     No Known Problems Brother     Colon cancer Neg Hx     Breast cancer Neg Hx        Social History     Social History    Marital status: Single     Spouse name: N/A    Number of children: N/A    Years of education: N/A     Occupational History    Not on file.     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  Infection History: Denies STDs. Denies PID.  Benign History: Denies uterine fibroids. Denies ovarian cysts. Denies endometriosis. Denies other conditions.  Cancer History: Denies cervical cancer. Denies uterine cancer or hyperplasia. Denies ovarian cancer. Denies vulvar cancer or pre-cancer. Denies vaginal cancer or pre-cancer. Denies breast cancer. Denies colon cancer.  Sexual Activity History: Reports currently being sexually active  Menstrual History: Monthly, mild-moderate.  Contraception:IUD    ROS:  GENERAL: No weight changes. No swelling. No fatigue. No fever.  CARDIOVASCULAR: No chest pain. No shortness of breath. No leg cramps.   NEUROLOGICAL: No headaches. No vision changes.  BREASTS: No pain. No lumps. No discharge.  ABDOMEN: No pain. No nausea. No vomiting. No diarrhea. No constipation.  REPRODUCTIVE: No abnormal bleeding.   VULVA: No pain. No lesions. No itching.  VAGINA: No relaxation. No itching. No odor. No discharge. No lesions.  URINARY: No incontinence. No nocturia. No frequency. No dysuria.    Ht 5' 6" (1.676 m)   Wt 65 kg (143 lb 4.8 oz)   LMP 06/15/2018   BMI 23.13 kg/m²     PE:  APPEARANCE: Well nourished, well developed, in no acute distress.  AFFECT: WNL, alert and oriented x 3.  SKIN: No acne or hirsutism.  NECK: Neck symmetric, without masses or thyromegaly.  NODES: No inguinal, cervical, axillary or femoral lymph node enlargement.  CHEST: Good respiratory effort.   ABDOMEN: Soft. No tenderness or masses. No hepatosplenomegaly. No hernias.  BREASTS: Symmetrical, no skin changes, visible lesions, palpable masses or " nipple discharge bilaterally.  PELVIC: External female genitalia with 2 SMALL DERMAL CYSTS AS ABOVE.  Female hair distribution. Adequate perineal body, Normal urethral meatus. Vagina moist and well rugated without lesions or discharge.  No significant cystocele or rectocele present. Cervix pink without lesions, discharge or tenderness, STRING AT EXTERNAL OS. Uterus is normal size, regular, mobile and nontender. Adnexa without masses or tenderness.  EXTREMITIES: No edema    PROCEDURES:      DIAGNOSIS:  1. Visit for gynecologic examination     2. Encounter for routine checking of intrauterine contraceptive device (IUD)     3. Follicular cyst of the skin and subcutaneous tissue, unspecified         LABS AND TESTS ORDERED:    MEDICATIONS PRESCRIBED:    COUNSELING:   The patient was counseled today on LOCAL CARE.    FOLLOW-UP with me annually.

## 2018-09-26 ENCOUNTER — LAB VISIT (OUTPATIENT)
Dept: LAB | Facility: HOSPITAL | Age: 33
End: 2018-09-26
Attending: FAMILY MEDICINE
Payer: COMMERCIAL

## 2018-09-26 DIAGNOSIS — Z00.00 ANNUAL PHYSICAL EXAM: ICD-10-CM

## 2018-09-26 LAB
ALBUMIN SERPL BCP-MCNC: 4.2 G/DL
ALP SERPL-CCNC: 58 U/L
ALT SERPL W/O P-5'-P-CCNC: 13 U/L
ANION GAP SERPL CALC-SCNC: 7 MMOL/L
AST SERPL-CCNC: 16 U/L
BASOPHILS # BLD AUTO: 0.05 K/UL
BASOPHILS NFR BLD: 0.7 %
BILIRUB SERPL-MCNC: 0.8 MG/DL
BUN SERPL-MCNC: 11 MG/DL
CALCIUM SERPL-MCNC: 9.7 MG/DL
CHLORIDE SERPL-SCNC: 105 MMOL/L
CHOLEST SERPL-MCNC: 217 MG/DL
CHOLEST/HDLC SERPL: 3.3 {RATIO}
CO2 SERPL-SCNC: 27 MMOL/L
CREAT SERPL-MCNC: 1 MG/DL
DIFFERENTIAL METHOD: NORMAL
EOSINOPHIL # BLD AUTO: 0.2 K/UL
EOSINOPHIL NFR BLD: 2.7 %
ERYTHROCYTE [DISTWIDTH] IN BLOOD BY AUTOMATED COUNT: 12.4 %
EST. GFR  (AFRICAN AMERICAN): >60 ML/MIN/1.73 M^2
EST. GFR  (NON AFRICAN AMERICAN): >60 ML/MIN/1.73 M^2
GLUCOSE SERPL-MCNC: 105 MG/DL
HCT VFR BLD AUTO: 42.5 %
HDLC SERPL-MCNC: 66 MG/DL
HDLC SERPL: 30.4 %
HGB BLD-MCNC: 14.2 G/DL
IMM GRANULOCYTES # BLD AUTO: 0.01 K/UL
IMM GRANULOCYTES NFR BLD AUTO: 0.1 %
LDLC SERPL CALC-MCNC: 132.6 MG/DL
LYMPHOCYTES # BLD AUTO: 1.7 K/UL
LYMPHOCYTES NFR BLD: 23.3 %
MCH RBC QN AUTO: 30.2 PG
MCHC RBC AUTO-ENTMCNC: 33.4 G/DL
MCV RBC AUTO: 90 FL
MONOCYTES # BLD AUTO: 0.5 K/UL
MONOCYTES NFR BLD: 7.3 %
NEUTROPHILS # BLD AUTO: 4.9 K/UL
NEUTROPHILS NFR BLD: 65.9 %
NONHDLC SERPL-MCNC: 151 MG/DL
NRBC BLD-RTO: 0 /100 WBC
PLATELET # BLD AUTO: 256 K/UL
PMV BLD AUTO: 10.6 FL
POTASSIUM SERPL-SCNC: 4.2 MMOL/L
PROT SERPL-MCNC: 7.3 G/DL
RBC # BLD AUTO: 4.7 M/UL
SODIUM SERPL-SCNC: 139 MMOL/L
TRIGL SERPL-MCNC: 92 MG/DL
WBC # BLD AUTO: 7.42 K/UL

## 2018-09-26 PROCEDURE — 80053 COMPREHEN METABOLIC PANEL: CPT

## 2018-09-26 PROCEDURE — 80061 LIPID PANEL: CPT

## 2018-09-26 PROCEDURE — 85025 COMPLETE CBC W/AUTO DIFF WBC: CPT

## 2018-09-26 PROCEDURE — 36415 COLL VENOUS BLD VENIPUNCTURE: CPT | Mod: PO

## 2018-10-02 ENCOUNTER — OFFICE VISIT (OUTPATIENT)
Dept: FAMILY MEDICINE | Facility: CLINIC | Age: 33
End: 2018-10-02
Payer: COMMERCIAL

## 2018-10-02 VITALS
BODY MASS INDEX: 25.08 KG/M2 | HEART RATE: 60 BPM | HEIGHT: 65 IN | WEIGHT: 150.56 LBS | DIASTOLIC BLOOD PRESSURE: 62 MMHG | TEMPERATURE: 98 F | SYSTOLIC BLOOD PRESSURE: 100 MMHG

## 2018-10-02 DIAGNOSIS — B00.1 FEVER BLISTER: ICD-10-CM

## 2018-10-02 DIAGNOSIS — Z00.00 ANNUAL PHYSICAL EXAM: Primary | ICD-10-CM

## 2018-10-02 PROCEDURE — 99395 PREV VISIT EST AGE 18-39: CPT | Mod: S$GLB,,, | Performed by: FAMILY MEDICINE

## 2018-10-02 PROCEDURE — 99999 PR PBB SHADOW E&M-EST. PATIENT-LVL III: CPT | Mod: PBBFAC,,, | Performed by: FAMILY MEDICINE

## 2018-10-02 RX ORDER — VALACYCLOVIR HYDROCHLORIDE 1 G/1
TABLET, FILM COATED ORAL
Qty: 30 TABLET | Refills: 12 | Status: SHIPPED | OUTPATIENT
Start: 2018-10-02 | End: 2019-11-12 | Stop reason: SDUPTHER

## 2018-10-02 NOTE — PATIENT INSTRUCTIONS
Try taking valacyclovir 1g DAILY for the next month    Try taking Lysine supplement daily to help with mouth ulcers    Follow with GYN for female health & cancer prevention    ==============================  RECOMMENDATIONS FOR FEMALES  ==============================  Your #1 MEDICINE is DAILY EXERCISE - 15-20 minutes of huffing & puffing EVERY DAY.     Prevent the #1 cause of death- cardiovascular disease (HEART ATTACK & STROKE) by checking for normal blood pressure, cholesterol, sugars, & by not smoking.     VACCINES: Yearly FLU shot, PNEUMONIA shot after 65,  SHINGLES shot after 50    Screening colonoscopy at AGE  50 & every 10 years to check for COLON CANCER,  one of the most common & preventable cancers (Or FIT kit yearly) Repeat in 3 years if POLYP found     I recommend  high fiber (5 fresh fruits or vegetables daily), low fat diet and aerobic  exercise (huffing/ puffing/ sweating for 20 min straight at least 4 days a week)    Follow up yearly with mammogram, fasting lipids, CMP, CBC prior.   ==============================================================

## 2018-10-02 NOTE — PROGRESS NOTES
Subjective:      Patient ID: Shannan Gayle is a 33 y.o. female.    Chief Complaint: Annual Exam    Here today for general exam.     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    Fever blisters are flaring up more regularly.  She takes it twice a day p.r.n.    Denies any chest pain, shortness of breath, nausea vomiting constipation diarrhea, blood in stool, heartburn    No results found for: HGBA1C  No results found for: MICALBCREAT  Lab Results   Component Value Date    LDLCALC 132.6 09/26/2018    LDLCALC 145.0 09/22/2017    CHOL 217 (H) 09/26/2018    HDL 66 09/26/2018    TRIG 92 09/26/2018       Lab Results   Component Value Date     09/26/2018    K 4.2 09/26/2018     09/26/2018    CO2 27 09/26/2018     09/26/2018    BUN 11 09/26/2018    CREATININE 1.0 09/26/2018    CALCIUM 9.7 09/26/2018    PROT 7.3 09/26/2018    ALBUMIN 4.2 09/26/2018    BILITOT 0.8 09/26/2018    ALKPHOS 58 09/26/2018    AST 16 09/26/2018    ALT 13 09/26/2018    ANIONGAP 7 (L) 09/26/2018    ESTGFRAFRICA >60.0 09/26/2018    EGFRNONAA >60.0 09/26/2018    WBC 7.42 09/26/2018    HGB 14.2 09/26/2018    HCT 42.5 09/26/2018    MCV 90 09/26/2018     09/26/2018    TSH 1.679 09/22/2017    [id number] 43 05/31/2013         Current Outpatient Medications on File Prior to Visit   Medication Sig    [DISCONTINUED] valACYclovir (VALTREX) 1000 MG tablet 2 TABS BY MOUTH EVERY 12 HOURS FOR 3-5 DAYS FOR FEVER BLISTERS    mupirocin (BACTROBAN) 2 % ointment Apply to affected area 3 times daily     No current facility-administered medications on file prior to visit.      Past Medical History:   Diagnosis Date    Fever blister 08/17/2016    Derm/Dr. Edgar Rosenberg 8/17/2016     No past surgical history on file.  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, rides her horse daily, enjoys intense cardiac exercise daily, running,  Classically trained in [employer], single, no children, nonsmoker, ETOH socially, GYN [facility]     Family History   Problem Relation Age of Onset    Autoimmune disease Father         in [place]    Hypertension Father     No Known Problems Mother     No Known Problems Brother     Colon cancer Neg Hx     Breast cancer Neg Hx      Vitals:    10/02/18 1254   BP: 100/62   Pulse: 60   Temp: 98.4 °F (36.9 °C)   TempSrc: Oral   Weight: 68.3 kg (150 lb 9.2 oz)   Height: 5' 5" (1.651 m)   PainSc: 0-No pain     Objective:   Physical Exam   Constitutional: She is oriented to person, place, and time. She appears well-developed and well-nourished.   HENT:   Head: Normocephalic and atraumatic.   Right Ear: External ear normal.   Left Ear: External ear normal.   Nose: Nose normal.   Mouth/Throat: Oropharynx is clear and moist.   Small blister on right lower lip   Eyes: EOM are normal. Pupils are equal, round, and reactive to light.   Neck: Neck supple. No thyromegaly present.   Cardiovascular: Normal rate, regular rhythm, normal heart sounds and intact distal pulses.   No murmur heard.  Pulmonary/Chest: Effort normal and breath sounds normal. She has no wheezes.   Abdominal: Soft. Bowel sounds are normal. She exhibits no distension and no mass. There is no tenderness. There is no rebound and no guarding.   Musculoskeletal: She exhibits no edema.   Lymphadenopathy:     She has no cervical adenopathy.   Neurological: She is alert and oriented to person, place, and time.   Skin: Skin is warm and dry.   Psychiatric: She has a normal mood and affect. Her behavior is normal.     Assessment:     1. Annual physical exam    2. Fever blister      Plan:     Orders Placed This Encounter    valACYclovir (VALTREX) 1000 MG tablet     Good luck in upcoming horse competition in [place]  Patient Instructions   Try taking valacyclovir 1g DAILY for the next " month    Try taking Lysine supplement daily to help with mouth ulcers    Follow with GYN for female health & cancer prevention    ==============================  RECOMMENDATIONS FOR FEMALES  ==============================  Your #1 MEDICINE is DAILY EXERCISE - 15-20 minutes of huffing & puffing EVERY DAY.     Prevent the #1 cause of death- cardiovascular disease (HEART ATTACK & STROKE) by checking for normal blood pressure, cholesterol, sugars, & by not smoking.     VACCINES: Yearly FLU shot, PNEUMONIA shot after 65,  SHINGLES shot after 50    Screening colonoscopy at AGE  50 & every 10 years to check for COLON CANCER,  one of the most common & preventable cancers (Or FIT kit yearly) Repeat in 3 years if POLYP found     I recommend  high fiber (5 fresh fruits or vegetables daily), low fat diet and aerobic  exercise (huffing/ puffing/ sweating for 20 min straight at least 4 days a week)    Follow up yearly with mammogram, fasting lipids, CMP, CBC prior.   ==============================================================

## 2018-10-29 ENCOUNTER — PATIENT MESSAGE (OUTPATIENT)
Dept: FAMILY MEDICINE | Facility: CLINIC | Age: 33
End: 2018-10-29

## 2018-11-01 ENCOUNTER — PATIENT MESSAGE (OUTPATIENT)
Dept: FAMILY MEDICINE | Facility: CLINIC | Age: 33
End: 2018-11-01

## 2018-11-01 NOTE — TELEPHONE ENCOUNTER
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 Form ready for  at our .

## 2019-06-11 ENCOUNTER — PATIENT MESSAGE (OUTPATIENT)
Dept: PRIMARY CARE CLINIC | Facility: CLINIC | Age: 34
End: 2019-06-11

## 2019-06-11 DIAGNOSIS — Z00.00 ROUTINE GENERAL MEDICAL EXAMINATION AT A HEALTH CARE FACILITY: Primary | ICD-10-CM

## 2019-09-09 ENCOUNTER — PATIENT MESSAGE (OUTPATIENT)
Dept: PRIMARY CARE CLINIC | Facility: CLINIC | Age: 34
End: 2019-09-09

## 2019-09-09 NOTE — TELEPHONE ENCOUNTER
Please call pt & see if we can fit her PHYS in an EPI slot. She's up todate on Health maintenance, fasting labs prior Thanks.

## 2019-09-26 ENCOUNTER — PATIENT OUTREACH (OUTPATIENT)
Dept: ADMINISTRATIVE | Facility: HOSPITAL | Age: 34
End: 2019-09-26

## 2019-09-26 NOTE — PROGRESS NOTES
Pre-visit chart review completed.  Immunizations reviewed and udpated.    Patient due for flu vaccine.

## 2019-10-07 ENCOUNTER — LAB VISIT (OUTPATIENT)
Dept: LAB | Facility: HOSPITAL | Age: 34
End: 2019-10-07
Attending: FAMILY MEDICINE
Payer: COMMERCIAL

## 2019-10-07 DIAGNOSIS — Z00.00 ROUTINE GENERAL MEDICAL EXAMINATION AT A HEALTH CARE FACILITY: ICD-10-CM

## 2019-10-07 LAB
ALBUMIN SERPL BCP-MCNC: 4.2 G/DL (ref 3.5–5.2)
ALP SERPL-CCNC: 55 U/L (ref 55–135)
ALT SERPL W/O P-5'-P-CCNC: 14 U/L (ref 10–44)
ANION GAP SERPL CALC-SCNC: 5 MMOL/L (ref 8–16)
AST SERPL-CCNC: 17 U/L (ref 10–40)
BASOPHILS # BLD AUTO: 0.04 K/UL (ref 0–0.2)
BASOPHILS NFR BLD: 0.8 % (ref 0–1.9)
BILIRUB SERPL-MCNC: 0.5 MG/DL (ref 0.1–1)
BUN SERPL-MCNC: 10 MG/DL (ref 6–20)
CALCIUM SERPL-MCNC: 9.3 MG/DL (ref 8.7–10.5)
CHLORIDE SERPL-SCNC: 107 MMOL/L (ref 95–110)
CHOLEST SERPL-MCNC: 238 MG/DL (ref 120–199)
CHOLEST/HDLC SERPL: 3.7 {RATIO} (ref 2–5)
CO2 SERPL-SCNC: 28 MMOL/L (ref 23–29)
CREAT SERPL-MCNC: 0.8 MG/DL (ref 0.5–1.4)
DIFFERENTIAL METHOD: NORMAL
EOSINOPHIL # BLD AUTO: 0.1 K/UL (ref 0–0.5)
EOSINOPHIL NFR BLD: 2.4 % (ref 0–8)
ERYTHROCYTE [DISTWIDTH] IN BLOOD BY AUTOMATED COUNT: 12.2 % (ref 11.5–14.5)
EST. GFR  (AFRICAN AMERICAN): >60 ML/MIN/1.73 M^2
EST. GFR  (NON AFRICAN AMERICAN): >60 ML/MIN/1.73 M^2
GLUCOSE SERPL-MCNC: 102 MG/DL (ref 70–110)
HCT VFR BLD AUTO: 43.3 % (ref 37–48.5)
HDLC SERPL-MCNC: 64 MG/DL (ref 40–75)
HDLC SERPL: 26.9 % (ref 20–50)
HGB BLD-MCNC: 13.9 G/DL (ref 12–16)
IMM GRANULOCYTES # BLD AUTO: 0.01 K/UL (ref 0–0.04)
IMM GRANULOCYTES NFR BLD AUTO: 0.2 % (ref 0–0.5)
LDLC SERPL CALC-MCNC: 162 MG/DL (ref 63–159)
LYMPHOCYTES # BLD AUTO: 1.6 K/UL (ref 1–4.8)
LYMPHOCYTES NFR BLD: 32.7 % (ref 18–48)
MCH RBC QN AUTO: 29.6 PG (ref 27–31)
MCHC RBC AUTO-ENTMCNC: 32.1 G/DL (ref 32–36)
MCV RBC AUTO: 92 FL (ref 82–98)
MONOCYTES # BLD AUTO: 0.4 K/UL (ref 0.3–1)
MONOCYTES NFR BLD: 8.7 % (ref 4–15)
NEUTROPHILS # BLD AUTO: 2.7 K/UL (ref 1.8–7.7)
NEUTROPHILS NFR BLD: 55.2 % (ref 38–73)
NONHDLC SERPL-MCNC: 174 MG/DL
NRBC BLD-RTO: 0 /100 WBC
PLATELET # BLD AUTO: 244 K/UL (ref 150–350)
PMV BLD AUTO: 10.6 FL (ref 9.2–12.9)
POTASSIUM SERPL-SCNC: 4.2 MMOL/L (ref 3.5–5.1)
PROT SERPL-MCNC: 7 G/DL (ref 6–8.4)
RBC # BLD AUTO: 4.69 M/UL (ref 4–5.4)
SODIUM SERPL-SCNC: 140 MMOL/L (ref 136–145)
TRIGL SERPL-MCNC: 60 MG/DL (ref 30–150)
WBC # BLD AUTO: 4.95 K/UL (ref 3.9–12.7)

## 2019-10-07 PROCEDURE — 36415 COLL VENOUS BLD VENIPUNCTURE: CPT | Mod: PO

## 2019-10-07 PROCEDURE — 80053 COMPREHEN METABOLIC PANEL: CPT

## 2019-10-07 PROCEDURE — 80061 LIPID PANEL: CPT

## 2019-10-07 PROCEDURE — 85025 COMPLETE CBC W/AUTO DIFF WBC: CPT

## 2019-10-10 ENCOUNTER — OFFICE VISIT (OUTPATIENT)
Dept: PRIMARY CARE CLINIC | Facility: CLINIC | Age: 34
End: 2019-10-10
Payer: COMMERCIAL

## 2019-10-10 VITALS
TEMPERATURE: 99 F | WEIGHT: 153 LBS | HEIGHT: 65 IN | DIASTOLIC BLOOD PRESSURE: 68 MMHG | SYSTOLIC BLOOD PRESSURE: 103 MMHG | BODY MASS INDEX: 25.49 KG/M2 | HEART RATE: 68 BPM

## 2019-10-10 DIAGNOSIS — B00.1 FEVER BLISTER: ICD-10-CM

## 2019-10-10 DIAGNOSIS — E78.2 MIXED HYPERLIPIDEMIA: ICD-10-CM

## 2019-10-10 DIAGNOSIS — Z86.59 HISTORY OF RECENT STRESSFUL LIFE EVENT: ICD-10-CM

## 2019-10-10 DIAGNOSIS — Z00.00 ROUTINE GENERAL MEDICAL EXAMINATION AT A HEALTH CARE FACILITY: Primary | ICD-10-CM

## 2019-10-10 PROCEDURE — 99999 PR PBB SHADOW E&M-EST. PATIENT-LVL III: CPT | Mod: PBBFAC,,, | Performed by: FAMILY MEDICINE

## 2019-10-10 PROCEDURE — 99395 PR PREVENTIVE VISIT,EST,18-39: ICD-10-PCS | Mod: S$GLB,,, | Performed by: FAMILY MEDICINE

## 2019-10-10 PROCEDURE — 99395 PREV VISIT EST AGE 18-39: CPT | Mod: S$GLB,,, | Performed by: FAMILY MEDICINE

## 2019-10-10 PROCEDURE — 99999 PR PBB SHADOW E&M-EST. PATIENT-LVL III: ICD-10-PCS | Mod: PBBFAC,,, | Performed by: FAMILY MEDICINE

## 2019-10-10 NOTE — PROGRESS NOTES
Subjective:      Patient ID: Shannan Gayle is a 34 y.o. female.    Chief Complaint: Annual Exam    Here today for general exam.     She works 7 days a week and has completed her degree as a .  As stress reliever she rides her horse daily, runs, intense aerobic exercise.  Seeing a therapist weekly over the past year has been helpful.     She is noticing that if she has cheese the night before blood work, LDL does increase.    Denies any chest pain, shortness of breath, nausea vomiting constipation diarrhea, blood in stool, heartburn      Current Outpatient Medications:     mupirocin (BACTROBAN) 2 % ointment, Apply to affected area 3 times daily, Disp: 22 g, Rfl: 1    valACYclovir (VALTREX) 1000 MG tablet, 2 TABS BY MOUTH EVERY 12 HOURS FOR 3-5 DAYS FOR FEVER BLISTERS, Disp: 30 tablet, Rfl: 12    No results found for: HGBA1C  No results found for: MICALBCREAT  Lab Results   Component Value Date    LDLCALC 162.0 (H) 10/07/2019    LDLCALC 132.6 09/26/2018    CHOL 238 (H) 10/07/2019    HDL 64 10/07/2019    TRIG 60 10/07/2019       Lab Results   Component Value Date     10/07/2019    K 4.2 10/07/2019     10/07/2019    CO2 28 10/07/2019     10/07/2019    BUN 10 10/07/2019    CREATININE 0.8 10/07/2019    CALCIUM 9.3 10/07/2019    PROT 7.0 10/07/2019    ALBUMIN 4.2 10/07/2019    BILITOT 0.5 10/07/2019    ALKPHOS 55 10/07/2019    AST 17 10/07/2019    ALT 14 10/07/2019    ANIONGAP 5 (L) 10/07/2019    ESTGFRAFRICA >60.0 10/07/2019    EGFRNONAA >60.0 10/07/2019    WBC 4.95 10/07/2019    HGB 13.9 10/07/2019    HGB 14.2 09/26/2018    HCT 43.3 10/07/2019    MCV 92 10/07/2019     10/07/2019    TSH 1.679 09/22/2017       Lab Results   Component Value Date    [id number] 43 05/31/2013    [id number] 538 05/31/2013         Past Medical History:   Diagnosis Date    Fever blister 08/17/2016    [place]/Dr. Hernández    History of recent stressful life event 10/10/2019    Riding  "horses daily helps, Therapist Jennifer Lockhart , [facility]    Mixed hyperlipidemia 10/10/2019    Rosacea 8/17/2016     No past surgical history on file.  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, rides her horse daily, enjoys intense cardiac exercise daily, running,  Classically trained in [employer], single, no children, nonsmoker, ETOH socially, GYN Alice     Family History   Problem Relation Age of Onset    Autoimmune disease Father         in [place]    Hypertension Father     No Known Problems Mother     No Known Problems Brother     Colon cancer Neg Hx     Breast cancer Neg Hx      Vitals:    10/10/19 1247   BP: 103/68   Pulse: 68   Temp: 98.9 °F (37.2 °C)   Weight: 69.4 kg (153 lb)   Height: 5' 5" (1.651 m)     Objective:   Physical Exam   Constitutional: She is oriented to person, place, and time. She appears well-developed and well-nourished.   HENT:   Head: Normocephalic and atraumatic.   Right Ear: External ear normal.   Left Ear: External ear normal.   Nose: Nose normal.   Mouth/Throat: Oropharynx is clear and moist.   Eyes: Pupils are equal, round, and reactive to light. EOM are normal.   Neck: Neck supple. No thyromegaly present.   Cardiovascular: Normal rate, regular rhythm, normal heart sounds and intact distal pulses.   No murmur heard.  Pulmonary/Chest: Effort normal and breath sounds normal. She has no wheezes.   Abdominal: Soft. Bowel sounds are normal. She exhibits no distension and no mass. There is no tenderness. There is no rebound and no guarding.   Musculoskeletal: She exhibits no edema.   Lymphadenopathy:     She has no cervical adenopathy.   Neurological: She is alert and oriented to person, place, and time.   Skin: Skin is warm and dry.   Psychiatric: She has a normal mood and affect. Her behavior is normal.     Assessment:     1. Routine general medical " examination at a health care facility    2. Mixed hyperlipidemia    3. Fever blister    4. History of recent stressful life event      Plan:        Filled out form for work    Continue with therapist & keep riding horses & running    Patient Instructions   Due for  Vaccines  - FLU at your pharmacy    Follow with GYN for female health & cancer prevention    ==============================  RECOMMENDATIONS FOR FEMALES  ==============================  Your #1 MEDICINE is DAILY EXERCISE - 15-20 minutes of huffing & puffing EVERY DAY.     Prevent the #1 cause of death- cardiovascular disease (HEART ATTACK & STROKE) by checking for normal blood pressure, cholesterol, sugars, & by not smoking.     VACCINES: Yearly FLU shot,     I recommend  high fiber (5 fresh fruits or vegetables daily), low fat diet and aerobic  exercise (huffing/ puffing/ sweating for 20 min straight at least 4 days a week)    Follow up yearly, needs LABS for her work insurance  ==============================================================

## 2019-10-10 NOTE — PATIENT INSTRUCTIONS
Due for  Vaccines  - FLU at your pharmacy    Follow with GYN for female health & cancer prevention    ==============================  RECOMMENDATIONS FOR FEMALES  ==============================  Your #1 MEDICINE is DAILY EXERCISE - 15-20 minutes of huffing & puffing EVERY DAY.     Prevent the #1 cause of death- cardiovascular disease (HEART ATTACK & STROKE) by checking for normal blood pressure, cholesterol, sugars, & by not smoking.     VACCINES: Yearly FLU shot,     I recommend  high fiber (5 fresh fruits or vegetables daily), low fat diet and aerobic  exercise (huffing/ puffing/ sweating for 20 min straight at least 4 days a week)    Follow up yearly, needs LABS for her work insurance  ==============================================================

## 2019-11-12 DIAGNOSIS — B00.1 FEVER BLISTER: ICD-10-CM

## 2019-11-12 RX ORDER — VALACYCLOVIR HYDROCHLORIDE 1 G/1
TABLET, FILM COATED ORAL
Qty: 30 TABLET | Refills: 12 | Status: SHIPPED | OUTPATIENT
Start: 2019-11-12 | End: 2020-11-24 | Stop reason: SDUPTHER

## 2020-10-07 DIAGNOSIS — Z00.00 ANNUAL PHYSICAL EXAM: Primary | ICD-10-CM

## 2020-10-08 ENCOUNTER — LAB VISIT (OUTPATIENT)
Dept: LAB | Facility: HOSPITAL | Age: 35
End: 2020-10-08
Attending: FAMILY MEDICINE
Payer: COMMERCIAL

## 2020-10-08 DIAGNOSIS — Z00.00 ANNUAL PHYSICAL EXAM: ICD-10-CM

## 2020-10-08 LAB
ALBUMIN SERPL BCP-MCNC: 4.5 G/DL (ref 3.5–5.2)
ALP SERPL-CCNC: 63 U/L (ref 55–135)
ALT SERPL W/O P-5'-P-CCNC: 15 U/L (ref 10–44)
ANION GAP SERPL CALC-SCNC: 15 MMOL/L (ref 8–16)
AST SERPL-CCNC: 19 U/L (ref 10–40)
BASOPHILS # BLD AUTO: 0.05 K/UL (ref 0–0.2)
BASOPHILS NFR BLD: 0.8 % (ref 0–1.9)
BILIRUB SERPL-MCNC: 0.6 MG/DL (ref 0.1–1)
BUN SERPL-MCNC: 11 MG/DL (ref 6–20)
CALCIUM SERPL-MCNC: 9.5 MG/DL (ref 8.7–10.5)
CHLORIDE SERPL-SCNC: 104 MMOL/L (ref 95–110)
CHOLEST SERPL-MCNC: 214 MG/DL (ref 120–199)
CHOLEST/HDLC SERPL: 3.1 {RATIO} (ref 2–5)
CO2 SERPL-SCNC: 23 MMOL/L (ref 23–29)
CREAT SERPL-MCNC: 1 MG/DL (ref 0.5–1.4)
DIFFERENTIAL METHOD: NORMAL
EOSINOPHIL # BLD AUTO: 0.1 K/UL (ref 0–0.5)
EOSINOPHIL NFR BLD: 1.6 % (ref 0–8)
ERYTHROCYTE [DISTWIDTH] IN BLOOD BY AUTOMATED COUNT: 12 % (ref 11.5–14.5)
EST. GFR  (AFRICAN AMERICAN): >60 ML/MIN/1.73 M^2
EST. GFR  (NON AFRICAN AMERICAN): >60 ML/MIN/1.73 M^2
GLUCOSE SERPL-MCNC: 100 MG/DL (ref 70–110)
HCT VFR BLD AUTO: 43.2 % (ref 37–48.5)
HDLC SERPL-MCNC: 68 MG/DL (ref 40–75)
HDLC SERPL: 31.8 % (ref 20–50)
HGB BLD-MCNC: 14.2 G/DL (ref 12–16)
IMM GRANULOCYTES # BLD AUTO: 0.01 K/UL (ref 0–0.04)
IMM GRANULOCYTES NFR BLD AUTO: 0.2 % (ref 0–0.5)
LDLC SERPL CALC-MCNC: 133.6 MG/DL (ref 63–159)
LYMPHOCYTES # BLD AUTO: 1.8 K/UL (ref 1–4.8)
LYMPHOCYTES NFR BLD: 27.4 % (ref 18–48)
MCH RBC QN AUTO: 30 PG (ref 27–31)
MCHC RBC AUTO-ENTMCNC: 32.9 G/DL (ref 32–36)
MCV RBC AUTO: 91 FL (ref 82–98)
MONOCYTES # BLD AUTO: 0.5 K/UL (ref 0.3–1)
MONOCYTES NFR BLD: 7.9 % (ref 4–15)
NEUTROPHILS # BLD AUTO: 4 K/UL (ref 1.8–7.7)
NEUTROPHILS NFR BLD: 62.1 % (ref 38–73)
NONHDLC SERPL-MCNC: 146 MG/DL
NRBC BLD-RTO: 0 /100 WBC
PLATELET # BLD AUTO: 256 K/UL (ref 150–350)
PMV BLD AUTO: 11.1 FL (ref 9.2–12.9)
POTASSIUM SERPL-SCNC: 4.4 MMOL/L (ref 3.5–5.1)
PROT SERPL-MCNC: 7.4 G/DL (ref 6–8.4)
RBC # BLD AUTO: 4.73 M/UL (ref 4–5.4)
SODIUM SERPL-SCNC: 142 MMOL/L (ref 136–145)
TRIGL SERPL-MCNC: 62 MG/DL (ref 30–150)
WBC # BLD AUTO: 6.42 K/UL (ref 3.9–12.7)

## 2020-10-08 PROCEDURE — 80061 LIPID PANEL: CPT

## 2020-10-08 PROCEDURE — 86803 HEPATITIS C AB TEST: CPT

## 2020-10-08 PROCEDURE — 85025 COMPLETE CBC W/AUTO DIFF WBC: CPT

## 2020-10-08 PROCEDURE — 80053 COMPREHEN METABOLIC PANEL: CPT

## 2020-10-08 PROCEDURE — 36415 COLL VENOUS BLD VENIPUNCTURE: CPT | Mod: PO

## 2020-10-09 ENCOUNTER — PATIENT MESSAGE (OUTPATIENT)
Dept: ADMINISTRATIVE | Facility: HOSPITAL | Age: 35
End: 2020-10-09

## 2020-10-10 LAB — HCV AB SERPL QL IA: NEGATIVE

## 2020-10-13 ENCOUNTER — OFFICE VISIT (OUTPATIENT)
Dept: PRIMARY CARE CLINIC | Facility: CLINIC | Age: 35
End: 2020-10-13
Payer: COMMERCIAL

## 2020-10-13 VITALS
HEIGHT: 65 IN | SYSTOLIC BLOOD PRESSURE: 134 MMHG | BODY MASS INDEX: 24.39 KG/M2 | WEIGHT: 146.38 LBS | HEART RATE: 72 BPM | DIASTOLIC BLOOD PRESSURE: 88 MMHG

## 2020-10-13 DIAGNOSIS — B00.1 FEVER BLISTER: ICD-10-CM

## 2020-10-13 DIAGNOSIS — Z00.00 ROUTINE GENERAL MEDICAL EXAMINATION AT A HEALTH CARE FACILITY: Primary | ICD-10-CM

## 2020-10-13 PROCEDURE — 99999 PR PBB SHADOW E&M-EST. PATIENT-LVL III: CPT | Mod: PBBFAC,,, | Performed by: FAMILY MEDICINE

## 2020-10-13 PROCEDURE — 99999 PR PBB SHADOW E&M-EST. PATIENT-LVL III: ICD-10-PCS | Mod: PBBFAC,,, | Performed by: FAMILY MEDICINE

## 2020-10-13 PROCEDURE — 99395 PR PREVENTIVE VISIT,EST,18-39: ICD-10-PCS | Mod: S$GLB,,, | Performed by: FAMILY MEDICINE

## 2020-10-13 PROCEDURE — 99395 PREV VISIT EST AGE 18-39: CPT | Mod: S$GLB,,, | Performed by: FAMILY MEDICINE

## 2020-10-13 NOTE — PROGRESS NOTES
Subjective:      Patient ID: Shannan Gayle is a 35 y.o. female.    Chief Complaint: Annual Exam    Here today for general exam.     LDL cholesterol is lower than last year, 133, was 162    Rarely needs bowel cycle of ear    Denies any chest pain, shortness of breath, nausea vomiting constipation diarrhea, blood in stool, heartburn      Current Outpatient Medications:     valACYclovir (VALTREX) 1000 MG tablet, 2 TABS BY MOUTH EVERY 12 HOURS FOR 3-5 DAYS FOR FEVER BLISTERS, Disp: 30 tablet, Rfl: 12    No results found for: HGBA1C  No results found for: MICALBCREAT  Lab Results   Component Value Date    LDLCALC 133.6 10/08/2020    LDLCALC 162.0 (H) 10/07/2019    CHOL 214 (H) 10/08/2020    HDL 68 10/08/2020    TRIG 62 10/08/2020       Lab Results   Component Value Date     10/08/2020    K 4.4 10/08/2020     10/08/2020    CO2 23 10/08/2020     10/08/2020    BUN 11 10/08/2020    CREATININE 1.0 10/08/2020    CALCIUM 9.5 10/08/2020    PROT 7.4 10/08/2020    ALBUMIN 4.5 10/08/2020    BILITOT 0.6 10/08/2020    ALKPHOS 63 10/08/2020    AST 19 10/08/2020    ALT 15 10/08/2020    ANIONGAP 15 10/08/2020    ESTGFRAFRICA >60.0 10/08/2020    EGFRNONAA >60.0 10/08/2020    WBC 6.42 10/08/2020    HGB 14.2 10/08/2020    HGB 13.9 10/07/2019    HCT 43.2 10/08/2020    MCV 91 10/08/2020     10/08/2020    TSH 1.679 09/22/2017    HEPCAB Negative 10/08/2020       Lab Results   Component Value Date    [id number] 43 05/31/2013    [id number] 538 05/31/2013         Past Medical History:   Diagnosis Date    Fever blister 08/17/2016    Derm/Dr. Hrenández    History of recent stressful life event 10/10/2019    Riding horses daily helps, Therapist Jennifer Lockhart , [facility]    Mixed hyperlipidemia 10/10/2019    Rosacea 8/17/2016     No past surgical history on file.  Social History     Social History Narrative    Dad with immune system disorder, dx at49 yo     Jack Godinez, rides her  "horse daily, enjoys intense cardiac exercise daily, running,  Classically trained in [employer], single, no children, nonsmoker, ETOH socially, GYN Alice     Family History   Problem Relation Age of Onset    Autoimmune disease Father         in [place]    Hypertension Father     No Known Problems Mother     No Known Problems Brother     Colon cancer Neg Hx     Breast cancer Neg Hx      Vitals:    10/13/20 1208   BP: 134/88   Pulse: 72   Weight: 66.4 kg (146 lb 6.2 oz)   Height: 5' 5" (1.651 m)     Objective:   Physical Exam  Constitutional:       Appearance: She is well-developed.   HENT:      Head: Normocephalic and atraumatic.      Nose:      Comments: Wearing mask due to current COVID 19 pandemic, Nose & mouth exam deferred  Eyes:      Pupils: Pupils are equal, round, and reactive to light.   Neck:      Musculoskeletal: Neck supple.      Thyroid: No thyromegaly.   Cardiovascular:      Rate and Rhythm: Normal rate and regular rhythm.      Heart sounds: Normal heart sounds. No murmur.   Pulmonary:      Effort: Pulmonary effort is normal.      Breath sounds: Normal breath sounds. No wheezing.   Abdominal:      General: Bowel sounds are normal. There is no distension.      Palpations: Abdomen is soft. There is no mass.      Tenderness: There is no abdominal tenderness. There is no guarding or rebound.   Lymphadenopathy:      Cervical: No cervical adenopathy.   Skin:     General: Skin is warm and dry.   Neurological:      Mental Status: She is alert and oriented to person, place, and time.   Psychiatric:         Behavior: Behavior normal.       Assessment:     1. Routine general medical examination at a health care facility    2. Fever blister      Plan:        Follow with GYN for female health & cancer prevention    Due for  Vaccines  - at your pharmacy    ==============================  RECOMMENDATIONS FOR FEMALES  ==============================  Your #1 MEDICINE is DAILY EXERCISE - 15-20 minutes of huffing & " puffing EVERY DAY.     Prevent the #1 cause of death- cardiovascular disease (HEART ATTACK & STROKE) by checking for normal blood pressure, cholesterol, sugars, & by not smoking.     VACCINES: Yearly FLU shot,     I recommend  high fiber (5 fresh fruits or vegetables daily), low fat diet and aerobic  exercise (huffing/ puffing/ sweating for 20 min straight at least 4 days a week)    Follow up yearly with mammogram, fasting lipids, CMP, CBC prior.   ==============================================================      There are no Patient Instructions on file for this visit.                            Answers for HPI/ROS submitted by the patient on 10/7/2020   activity change: No  unexpected weight change: No  neck pain: No  hearing loss: No  rhinorrhea: No  trouble swallowing: No  eye discharge: No  visual disturbance: No  chest tightness: No  wheezing: No  chest pain: No  palpitations: No  blood in stool: No  constipation: No  vomiting: No  diarrhea: No  polydipsia: No  polyuria: No  difficulty urinating: No  hematuria: No  menstrual problem: No  dysuria: No  joint swelling: No  arthralgias: No  headaches: No  weakness: No  confusion: No  dysphoric mood: No

## 2020-11-24 DIAGNOSIS — B00.1 FEVER BLISTER: ICD-10-CM

## 2020-11-24 RX ORDER — VALACYCLOVIR HYDROCHLORIDE 1 G/1
TABLET, FILM COATED ORAL
Qty: 30 TABLET | Refills: 12 | Status: SHIPPED | OUTPATIENT
Start: 2020-11-24 | End: 2020-11-24 | Stop reason: SDUPTHER

## 2020-11-24 RX ORDER — VALACYCLOVIR HYDROCHLORIDE 1 G/1
TABLET, FILM COATED ORAL
Qty: 30 TABLET | Refills: 12 | Status: SHIPPED | OUTPATIENT
Start: 2020-11-24 | End: 2022-01-07

## 2021-03-11 ENCOUNTER — IMMUNIZATION (OUTPATIENT)
Dept: FAMILY MEDICINE | Facility: CLINIC | Age: 36
End: 2021-03-11
Payer: COMMERCIAL

## 2021-03-11 DIAGNOSIS — Z23 NEED FOR VACCINATION: Primary | ICD-10-CM

## 2021-03-11 PROCEDURE — 0001A COVID-19, MRNA, LNP-S, PF, 30 MCG/0.3 ML DOSE VACCINE: CPT | Mod: CV19,,, | Performed by: FAMILY MEDICINE

## 2021-03-11 PROCEDURE — 91300 COVID-19, MRNA, LNP-S, PF, 30 MCG/0.3 ML DOSE VACCINE: CPT | Mod: ,,, | Performed by: FAMILY MEDICINE

## 2021-03-11 PROCEDURE — 0001A COVID-19, MRNA, LNP-S, PF, 30 MCG/0.3 ML DOSE VACCINE: ICD-10-PCS | Mod: CV19,,, | Performed by: FAMILY MEDICINE

## 2021-03-11 PROCEDURE — 91300 COVID-19, MRNA, LNP-S, PF, 30 MCG/0.3 ML DOSE VACCINE: ICD-10-PCS | Mod: ,,, | Performed by: FAMILY MEDICINE

## 2021-03-15 ENCOUNTER — PATIENT MESSAGE (OUTPATIENT)
Dept: OBSTETRICS AND GYNECOLOGY | Facility: CLINIC | Age: 36
End: 2021-03-15

## 2021-03-16 ENCOUNTER — OFFICE VISIT (OUTPATIENT)
Dept: OBSTETRICS AND GYNECOLOGY | Facility: CLINIC | Age: 36
End: 2021-03-16
Payer: COMMERCIAL

## 2021-03-16 ENCOUNTER — PATIENT OUTREACH (OUTPATIENT)
Dept: ADMINISTRATIVE | Facility: OTHER | Age: 36
End: 2021-03-16

## 2021-03-16 VITALS
WEIGHT: 147.19 LBS | BODY MASS INDEX: 24.49 KG/M2 | DIASTOLIC BLOOD PRESSURE: 74 MMHG | SYSTOLIC BLOOD PRESSURE: 116 MMHG

## 2021-03-16 DIAGNOSIS — N76.0 BV (BACTERIAL VAGINOSIS): ICD-10-CM

## 2021-03-16 DIAGNOSIS — Z30.431 ENCOUNTER FOR ROUTINE CHECKING OF INTRAUTERINE CONTRACEPTIVE DEVICE (IUD): ICD-10-CM

## 2021-03-16 DIAGNOSIS — Z01.419 VISIT FOR GYNECOLOGIC EXAMINATION: Primary | ICD-10-CM

## 2021-03-16 DIAGNOSIS — B96.89 BV (BACTERIAL VAGINOSIS): ICD-10-CM

## 2021-03-16 DIAGNOSIS — R10.2 PELVIC PAIN: ICD-10-CM

## 2021-03-16 PROCEDURE — 3008F PR BODY MASS INDEX (BMI) DOCUMENTED: ICD-10-PCS | Mod: CPTII,S$GLB,, | Performed by: OBSTETRICS & GYNECOLOGY

## 2021-03-16 PROCEDURE — 87481 CANDIDA DNA AMP PROBE: CPT | Mod: 59 | Performed by: OBSTETRICS & GYNECOLOGY

## 2021-03-16 PROCEDURE — 87661 TRICHOMONAS VAGINALIS AMPLIF: CPT | Performed by: OBSTETRICS & GYNECOLOGY

## 2021-03-16 PROCEDURE — 99999 PR PBB SHADOW E&M-EST. PATIENT-LVL II: ICD-10-PCS | Mod: PBBFAC,,, | Performed by: OBSTETRICS & GYNECOLOGY

## 2021-03-16 PROCEDURE — 87624 HPV HI-RISK TYP POOLED RSLT: CPT | Performed by: OBSTETRICS & GYNECOLOGY

## 2021-03-16 PROCEDURE — 99395 PREV VISIT EST AGE 18-39: CPT | Mod: S$GLB,,, | Performed by: OBSTETRICS & GYNECOLOGY

## 2021-03-16 PROCEDURE — 3008F BODY MASS INDEX DOCD: CPT | Mod: CPTII,S$GLB,, | Performed by: OBSTETRICS & GYNECOLOGY

## 2021-03-16 PROCEDURE — 88175 CYTOPATH C/V AUTO FLUID REDO: CPT | Performed by: OBSTETRICS & GYNECOLOGY

## 2021-03-16 PROCEDURE — 99395 PR PREVENTIVE VISIT,EST,18-39: ICD-10-PCS | Mod: S$GLB,,, | Performed by: OBSTETRICS & GYNECOLOGY

## 2021-03-16 PROCEDURE — 99999 PR PBB SHADOW E&M-EST. PATIENT-LVL II: CPT | Mod: PBBFAC,,, | Performed by: OBSTETRICS & GYNECOLOGY

## 2021-03-16 RX ORDER — METRONIDAZOLE 500 MG/1
500 TABLET ORAL 2 TIMES DAILY
Qty: 10 TABLET | Refills: 1 | Status: SHIPPED | OUTPATIENT
Start: 2021-03-16 | End: 2021-03-21

## 2021-03-18 LAB
BACTERIAL VAGINOSIS DNA: POSITIVE
CANDIDA GLABRATA DNA: NEGATIVE
CANDIDA KRUSEI DNA: NEGATIVE
CANDIDA RRNA VAG QL PROBE: POSITIVE
T VAGINALIS RRNA GENITAL QL PROBE: NEGATIVE

## 2021-03-18 RX ORDER — FLUCONAZOLE 150 MG/1
150 TABLET ORAL ONCE
Qty: 1 TABLET | Refills: 1 | Status: SHIPPED | OUTPATIENT
Start: 2021-03-18 | End: 2021-03-18

## 2021-03-19 LAB
HPV HR 12 DNA SPEC QL NAA+PROBE: NEGATIVE
HPV16 AG SPEC QL: NEGATIVE
HPV18 DNA SPEC QL NAA+PROBE: POSITIVE

## 2021-03-22 ENCOUNTER — PATIENT MESSAGE (OUTPATIENT)
Dept: OBSTETRICS AND GYNECOLOGY | Facility: CLINIC | Age: 36
End: 2021-03-22

## 2021-03-22 LAB
FINAL PATHOLOGIC DIAGNOSIS: NORMAL
Lab: NORMAL

## 2021-03-22 RX ORDER — FLUCONAZOLE 150 MG/1
150 TABLET ORAL ONCE
Qty: 1 TABLET | Refills: 1 | Status: SHIPPED | OUTPATIENT
Start: 2021-03-22 | End: 2021-03-22

## 2021-03-25 ENCOUNTER — TELEPHONE (OUTPATIENT)
Dept: OBSTETRICS AND GYNECOLOGY | Facility: CLINIC | Age: 36
End: 2021-03-25

## 2021-03-25 RX ORDER — NITROFURANTOIN 25; 75 MG/1; MG/1
100 CAPSULE ORAL 2 TIMES DAILY
Qty: 14 CAPSULE | Refills: 0 | Status: SHIPPED | OUTPATIENT
Start: 2021-03-25 | End: 2021-04-01

## 2021-04-01 ENCOUNTER — IMMUNIZATION (OUTPATIENT)
Dept: FAMILY MEDICINE | Facility: CLINIC | Age: 36
End: 2021-04-01
Payer: COMMERCIAL

## 2021-04-01 DIAGNOSIS — Z23 NEED FOR VACCINATION: Primary | ICD-10-CM

## 2021-04-01 PROCEDURE — 91300 COVID-19, MRNA, LNP-S, PF, 30 MCG/0.3 ML DOSE VACCINE: CPT | Mod: PBBFAC | Performed by: FAMILY MEDICINE

## 2021-04-01 PROCEDURE — 0002A COVID-19, MRNA, LNP-S, PF, 30 MCG/0.3 ML DOSE VACCINE: CPT | Mod: PBBFAC | Performed by: FAMILY MEDICINE

## 2021-05-25 ENCOUNTER — PROCEDURE VISIT (OUTPATIENT)
Dept: OBSTETRICS AND GYNECOLOGY | Facility: CLINIC | Age: 36
End: 2021-05-25
Payer: COMMERCIAL

## 2021-05-25 VITALS
DIASTOLIC BLOOD PRESSURE: 72 MMHG | WEIGHT: 143.94 LBS | BODY MASS INDEX: 23.96 KG/M2 | SYSTOLIC BLOOD PRESSURE: 116 MMHG

## 2021-05-25 DIAGNOSIS — R87.810 CERVICAL HIGH RISK HPV (HUMAN PAPILLOMAVIRUS) TEST POSITIVE: Primary | ICD-10-CM

## 2021-05-25 PROCEDURE — 88342 IMHCHEM/IMCYTCHM 1ST ANTB: CPT | Mod: 59 | Performed by: PATHOLOGY

## 2021-05-25 PROCEDURE — 57454 PR COLPOSC,CERVIX W/ADJ VAG,W/BX & CURRETAG: ICD-10-PCS | Mod: S$GLB,,, | Performed by: OBSTETRICS & GYNECOLOGY

## 2021-05-25 PROCEDURE — 88342 CHG IMMUNOCYTOCHEMISTRY: ICD-10-PCS | Mod: 26,,, | Performed by: PATHOLOGY

## 2021-05-25 PROCEDURE — 88342 IMHCHEM/IMCYTCHM 1ST ANTB: CPT | Mod: 26,,, | Performed by: PATHOLOGY

## 2021-05-25 PROCEDURE — 88305 TISSUE EXAM BY PATHOLOGIST: ICD-10-PCS | Mod: 26,,, | Performed by: PATHOLOGY

## 2021-05-25 PROCEDURE — 57454 BX/CURETT OF CERVIX W/SCOPE: CPT | Mod: S$GLB,,, | Performed by: OBSTETRICS & GYNECOLOGY

## 2021-05-25 PROCEDURE — 88305 TISSUE EXAM BY PATHOLOGIST: CPT | Performed by: PATHOLOGY

## 2021-05-25 PROCEDURE — 88305 TISSUE EXAM BY PATHOLOGIST: CPT | Mod: 26,,, | Performed by: PATHOLOGY

## 2021-06-14 LAB
FINAL PATHOLOGIC DIAGNOSIS: NORMAL
GROSS: NORMAL
Lab: NORMAL

## 2021-06-16 ENCOUNTER — PATIENT MESSAGE (OUTPATIENT)
Dept: OBSTETRICS AND GYNECOLOGY | Facility: CLINIC | Age: 36
End: 2021-06-16

## 2021-11-04 ENCOUNTER — PATIENT MESSAGE (OUTPATIENT)
Dept: PRIMARY CARE CLINIC | Facility: CLINIC | Age: 36
End: 2021-11-04
Payer: COMMERCIAL

## 2021-11-09 ENCOUNTER — TELEPHONE (OUTPATIENT)
Dept: PRIMARY CARE CLINIC | Facility: CLINIC | Age: 36
End: 2021-11-09
Payer: COMMERCIAL

## 2021-12-10 ENCOUNTER — OFFICE VISIT (OUTPATIENT)
Dept: URGENT CARE | Facility: CLINIC | Age: 36
End: 2021-12-10
Payer: COMMERCIAL

## 2021-12-10 VITALS
DIASTOLIC BLOOD PRESSURE: 62 MMHG | HEIGHT: 65 IN | HEART RATE: 78 BPM | BODY MASS INDEX: 23.82 KG/M2 | RESPIRATION RATE: 16 BRPM | WEIGHT: 143 LBS | TEMPERATURE: 98 F | SYSTOLIC BLOOD PRESSURE: 102 MMHG | OXYGEN SATURATION: 97 %

## 2021-12-10 DIAGNOSIS — J34.9 SINUS PROBLEM: Primary | ICD-10-CM

## 2021-12-10 LAB
CTP QC/QA: YES
SARS-COV-2 RDRP RESP QL NAA+PROBE: NEGATIVE

## 2021-12-10 PROCEDURE — 99214 PR OFFICE/OUTPT VISIT, EST, LEVL IV, 30-39 MIN: ICD-10-PCS | Mod: 25,S$GLB,, | Performed by: FAMILY MEDICINE

## 2021-12-10 PROCEDURE — 96372 PR INJECTION,THERAP/PROPH/DIAG2ST, IM OR SUBCUT: ICD-10-PCS | Mod: S$GLB,,, | Performed by: FAMILY MEDICINE

## 2021-12-10 PROCEDURE — 96372 THER/PROPH/DIAG INJ SC/IM: CPT | Mod: S$GLB,,, | Performed by: FAMILY MEDICINE

## 2021-12-10 PROCEDURE — U0002: ICD-10-PCS | Mod: QW,S$GLB,, | Performed by: FAMILY MEDICINE

## 2021-12-10 PROCEDURE — 99214 OFFICE O/P EST MOD 30 MIN: CPT | Mod: 25,S$GLB,, | Performed by: FAMILY MEDICINE

## 2021-12-10 PROCEDURE — U0002 COVID-19 LAB TEST NON-CDC: HCPCS | Mod: QW,S$GLB,, | Performed by: FAMILY MEDICINE

## 2021-12-10 RX ORDER — PROMETHAZINE HYDROCHLORIDE 6.25 MG/5ML
SYRUP ORAL
Qty: 120 ML | Refills: 1 | Status: SHIPPED | OUTPATIENT
Start: 2021-12-10 | End: 2022-03-09

## 2021-12-10 RX ORDER — BETAMETHASONE SODIUM PHOSPHATE AND BETAMETHASONE ACETATE 3; 3 MG/ML; MG/ML
6 INJECTION, SUSPENSION INTRA-ARTICULAR; INTRALESIONAL; INTRAMUSCULAR; SOFT TISSUE ONCE
Status: COMPLETED | OUTPATIENT
Start: 2021-12-10 | End: 2021-12-10

## 2021-12-10 RX ADMIN — BETAMETHASONE SODIUM PHOSPHATE AND BETAMETHASONE ACETATE 6 MG: 3; 3 INJECTION, SUSPENSION INTRA-ARTICULAR; INTRALESIONAL; INTRAMUSCULAR; SOFT TISSUE at 02:12

## 2021-12-23 ENCOUNTER — IMMUNIZATION (OUTPATIENT)
Dept: FAMILY MEDICINE | Facility: CLINIC | Age: 36
End: 2021-12-23
Payer: COMMERCIAL

## 2021-12-23 DIAGNOSIS — Z23 NEED FOR VACCINATION: Primary | ICD-10-CM

## 2021-12-23 PROCEDURE — 0004A COVID-19, MRNA, LNP-S, PF, 30 MCG/0.3 ML DOSE VACCINE: CPT | Mod: PBBFAC | Performed by: RADIOLOGY

## 2022-01-06 DIAGNOSIS — B00.1 FEVER BLISTER: ICD-10-CM

## 2022-01-06 NOTE — TELEPHONE ENCOUNTER
No new care gaps identified.  Powered by [employer]. Reference number: [id number].   1/06/2022 5:31:44 PM CST

## 2022-01-07 DIAGNOSIS — B00.1 FEVER BLISTER: ICD-10-CM

## 2022-01-07 RX ORDER — VALACYCLOVIR HYDROCHLORIDE 1 G/1
TABLET, FILM COATED ORAL
Qty: 30 TABLET | Refills: 3 | Status: SHIPPED | OUTPATIENT
Start: 2022-01-07 | End: 2022-01-07 | Stop reason: SDUPTHER

## 2022-01-07 NOTE — TELEPHONE ENCOUNTER
No new care gaps identified.  Powered by [employer] Reference number: [id number].   1/07/2022 1:44:46 PM CST

## 2022-01-10 RX ORDER — VALACYCLOVIR HYDROCHLORIDE 1 G/1
TABLET, FILM COATED ORAL
Qty: 90 TABLET | Refills: 3 | Status: SHIPPED | OUTPATIENT
Start: 2022-01-10 | End: 2023-01-19 | Stop reason: SDUPTHER

## 2022-01-17 ENCOUNTER — PATIENT MESSAGE (OUTPATIENT)
Dept: ADMINISTRATIVE | Facility: OTHER | Age: 37
End: 2022-01-17
Payer: COMMERCIAL

## 2022-01-18 ENCOUNTER — PATIENT MESSAGE (OUTPATIENT)
Dept: ADMINISTRATIVE | Facility: HOSPITAL | Age: 37
End: 2022-01-18
Payer: COMMERCIAL

## 2022-03-08 ENCOUNTER — PATIENT MESSAGE (OUTPATIENT)
Dept: PRIMARY CARE CLINIC | Facility: CLINIC | Age: 37
End: 2022-03-08
Payer: COMMERCIAL

## 2022-03-08 NOTE — TELEPHONE ENCOUNTER
Please have her go to ER if painful & worse, as this is the only place to get quick results. (if she recently was on a plane or long carride, I recommend ER bc DVT to PE is life threatening)    If this has been going on for a long time & not painful, ok to offer appt with me (a locked or daily slot) or one of my colleagues (NOT urgent care as they don't have ultrasound)

## 2022-03-09 ENCOUNTER — OFFICE VISIT (OUTPATIENT)
Dept: PRIMARY CARE CLINIC | Facility: CLINIC | Age: 37
End: 2022-03-09
Payer: COMMERCIAL

## 2022-03-09 ENCOUNTER — HOSPITAL ENCOUNTER (OUTPATIENT)
Dept: RADIOLOGY | Facility: OTHER | Age: 37
Discharge: HOME | End: 2022-03-09
Attending: FAMILY MEDICINE
Payer: COMMERCIAL

## 2022-03-09 VITALS
TEMPERATURE: 98 F | HEART RATE: 52 BPM | OXYGEN SATURATION: 99 % | DIASTOLIC BLOOD PRESSURE: 60 MMHG | SYSTOLIC BLOOD PRESSURE: 98 MMHG | HEIGHT: 65 IN | WEIGHT: 155.63 LBS | BODY MASS INDEX: 25.93 KG/M2

## 2022-03-09 DIAGNOSIS — M79.89 RIGHT LEG SWELLING: Primary | ICD-10-CM

## 2022-03-09 DIAGNOSIS — M79.89 RIGHT LEG SWELLING: ICD-10-CM

## 2022-03-09 PROCEDURE — 3078F PR MOST RECENT DIASTOLIC BLOOD PRESSURE < 80 MM HG: ICD-10-PCS | Mod: CPTII,S$GLB,, | Performed by: FAMILY MEDICINE

## 2022-03-09 PROCEDURE — 3008F PR BODY MASS INDEX (BMI) DOCUMENTED: ICD-10-PCS | Mod: CPTII,S$GLB,, | Performed by: FAMILY MEDICINE

## 2022-03-09 PROCEDURE — 3008F BODY MASS INDEX DOCD: CPT | Mod: CPTII,S$GLB,, | Performed by: FAMILY MEDICINE

## 2022-03-09 PROCEDURE — 99214 PR OFFICE/OUTPT VISIT, EST, LEVL IV, 30-39 MIN: ICD-10-PCS | Mod: S$GLB,,, | Performed by: FAMILY MEDICINE

## 2022-03-09 PROCEDURE — 3078F DIAST BP <80 MM HG: CPT | Mod: CPTII,S$GLB,, | Performed by: FAMILY MEDICINE

## 2022-03-09 PROCEDURE — 1159F MED LIST DOCD IN RCRD: CPT | Mod: CPTII,S$GLB,, | Performed by: FAMILY MEDICINE

## 2022-03-09 PROCEDURE — 93971 US LOWER EXTREMITY VEINS RIGHT: ICD-10-PCS | Mod: 26,RT,, | Performed by: RADIOLOGY

## 2022-03-09 PROCEDURE — 99999 PR PBB SHADOW E&M-EST. PATIENT-LVL IV: ICD-10-PCS | Mod: PBBFAC,,, | Performed by: FAMILY MEDICINE

## 2022-03-09 PROCEDURE — 93971 EXTREMITY STUDY: CPT | Mod: 26,RT,, | Performed by: RADIOLOGY

## 2022-03-09 PROCEDURE — 3074F PR MOST RECENT SYSTOLIC BLOOD PRESSURE < 130 MM HG: ICD-10-PCS | Mod: CPTII,S$GLB,, | Performed by: FAMILY MEDICINE

## 2022-03-09 PROCEDURE — 1160F RVW MEDS BY RX/DR IN RCRD: CPT | Mod: CPTII,S$GLB,, | Performed by: FAMILY MEDICINE

## 2022-03-09 PROCEDURE — 3074F SYST BP LT 130 MM HG: CPT | Mod: CPTII,S$GLB,, | Performed by: FAMILY MEDICINE

## 2022-03-09 PROCEDURE — 99214 OFFICE O/P EST MOD 30 MIN: CPT | Mod: S$GLB,,, | Performed by: FAMILY MEDICINE

## 2022-03-09 PROCEDURE — 1159F PR MEDICATION LIST DOCUMENTED IN MEDICAL RECORD: ICD-10-PCS | Mod: CPTII,S$GLB,, | Performed by: FAMILY MEDICINE

## 2022-03-09 PROCEDURE — 1160F PR REVIEW ALL MEDS BY PRESCRIBER/CLIN PHARMACIST DOCUMENTED: ICD-10-PCS | Mod: CPTII,S$GLB,, | Performed by: FAMILY MEDICINE

## 2022-03-09 PROCEDURE — 99999 PR PBB SHADOW E&M-EST. PATIENT-LVL IV: CPT | Mod: PBBFAC,,, | Performed by: FAMILY MEDICINE

## 2022-03-09 PROCEDURE — 93971 EXTREMITY STUDY: CPT | Mod: TC,RT

## 2022-03-09 NOTE — PROGRESS NOTES
Subjective:      Patient ID: Shannan Gayle is a 36 y.o. female.    Chief Complaint: Leg Pain (left)    Here today for right leg swelling.  Her  Father Sj is visiting from [place] and is with her today.  He is worried because a friend had DVT with debilitating pulmonary embolism and stroke.  He wants to make sure she does not have this.      He adds his personal medical history because of concern of extremity swelling.  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  Was not diagnosed until he had vascular surgery exploration.  Also found in the right shoulder during his shoulder replacement surgery.  Two in his ankle, also in the arm and his neck and shoulder region.  He has a high pain tolerance, but he would feel severe pain and this was always a sign of a new tumor.  He also has IgA deficiency.  He has immunologist told him this may be a rare  constellation of diagnosis with  his immunodeficiency.    Shannan tells me that she had right leg swelling for the past few weeks, but it is on and off.  She initially noticed it because she could not zip up her custom-made [employer] riding boots.  She is an avid equestrian.  On 1 occasion, when forcing her boot on, she broke the zipper.  It is uncomfortable to wear the tight boot on her right, but she does not have any bad significant pain.  No redness, it is not hot, no fever.  Not affected by motion.  Does not awaken her at night.  Does not need to take any pain medication.  Does not take oral contraceptive, her last plane flight or long drive was 5 months ago,  October 2021. No recent trauma.  No problems with this in the past.      Current Outpatient Medications:     valACYclovir (VALTREX) 1000 MG tablet, 1 po qd, Disp: 90 tablet, Rfl: 3    No results found for: HGBA1C  No results found for: MICALBCREAT  Lab Results   Component Value Date    LDLCALC 133.6 10/08/2020    LDLCALC 162.0 (H) 10/07/2019    CHOL 214  "(H) 10/08/2020    HDL 68 10/08/2020    TRIG 62 10/08/2020       Lab Results   Component Value Date     10/08/2020    K 4.4 10/08/2020     10/08/2020    CO2 23 10/08/2020     10/08/2020    BUN 11 10/08/2020    CREATININE 1.0 10/08/2020    CALCIUM 9.5 10/08/2020    PROT 7.4 10/08/2020    ALBUMIN 4.5 10/08/2020    BILITOT 0.6 10/08/2020    ALKPHOS 63 10/08/2020    AST 19 10/08/2020    ALT 15 10/08/2020    ANIONGAP 15 10/08/2020    ESTGFRAFRICA >60.0 10/08/2020    EGFRNONAA >60.0 10/08/2020    WBC 6.42 10/08/2020    HGB 14.2 10/08/2020    HGB 13.9 10/07/2019    HCT 43.2 10/08/2020    MCV 91 10/08/2020     10/08/2020    TSH 1.679 09/22/2017    HEPCAB Negative 10/08/2020       Lab Results   Component Value Date    [id number] 43 05/31/2013    [id number] 538 05/31/2013         Past Medical History:   Diagnosis Date    Fever blister 08/17/2016    Derm/Dr. Hernández    History of recent stressful life event 10/10/2019    Riding horses daily helps, Therapist Jennifer Lockhart , [facility]    Mixed hyperlipidemia 10/10/2019    Rosacea 8/17/2016     No past surgical history on file.  Social History     Social History Narrative    purchased new horse, now 2, had puppies, Dad with immune system disorder, dx at49 yo     Jack Godinez, rides her horse daily, enjoys intense cardiac exercise daily, running,  Classically trained in [employer], single, no children, nonsmoker, ETOH socially, GYN Alice     Family History   Problem Relation Age of Onset    Autoimmune disease Father         in [place]    Hypertension Father     No Known Problems Mother     No Known Problems Brother     Colon cancer Neg Hx     Breast cancer Neg Hx      Vitals:    03/09/22 0952   BP: 98/60   Pulse: (!) 52   Temp: 98 °F (36.7 °C)   SpO2: 99%   Weight: 70.6 kg (155 lb 10.3 oz)   Height: 5' 5" (1.651 m)   PainSc:   4     Objective:   Physical Exam  Musculoskeletal:      Comments: Bilateral calves " supple , nontender, no redness , mild swelling RIGHT calf,  knee no tenderness with pressure on the patella and full extension, no crepitance, no pain with walking along the joint line, intact MCL PCL ACL LCL, 2+ pulses femoral, DP, less than 2 second capillary refill           Assessment:     1. Right leg swelling      Plan:     Orders Placed This Encounter    US Lower Extremity Veins Right   urgent, to ER if worse    We discussed differential diagnosis of DVT (life threatening, although she does not have these symptoms), Baker cyst, arthritis, peripheral vascular disease.  If negative for anything worrisome, consider SANJANA or referral to vascular surgeon as her father had tumors in the blood vessels not identified through MRI , US or imaging.  These were tumors in the vascular system were diagnosed in surgery    There are no Patient Instructions on file for this visit.

## 2022-05-12 ENCOUNTER — PATIENT MESSAGE (OUTPATIENT)
Dept: OBSTETRICS AND GYNECOLOGY | Facility: CLINIC | Age: 37
End: 2022-05-12
Payer: COMMERCIAL

## 2022-06-20 ENCOUNTER — OFFICE VISIT (OUTPATIENT)
Dept: OBSTETRICS AND GYNECOLOGY | Facility: CLINIC | Age: 37
End: 2022-06-20
Payer: COMMERCIAL

## 2022-06-20 VITALS
WEIGHT: 153.13 LBS | BODY MASS INDEX: 25.48 KG/M2 | DIASTOLIC BLOOD PRESSURE: 76 MMHG | SYSTOLIC BLOOD PRESSURE: 106 MMHG

## 2022-06-20 DIAGNOSIS — Z01.419 VISIT FOR GYNECOLOGIC EXAMINATION: Primary | ICD-10-CM

## 2022-06-20 DIAGNOSIS — Z30.431 ENCOUNTER FOR ROUTINE CHECKING OF INTRAUTERINE CONTRACEPTIVE DEVICE (IUD): ICD-10-CM

## 2022-06-20 PROCEDURE — 3008F PR BODY MASS INDEX (BMI) DOCUMENTED: ICD-10-PCS | Mod: CPTII,S$GLB,, | Performed by: OBSTETRICS & GYNECOLOGY

## 2022-06-20 PROCEDURE — 1159F PR MEDICATION LIST DOCUMENTED IN MEDICAL RECORD: ICD-10-PCS | Mod: CPTII,S$GLB,, | Performed by: OBSTETRICS & GYNECOLOGY

## 2022-06-20 PROCEDURE — 3078F DIAST BP <80 MM HG: CPT | Mod: CPTII,S$GLB,, | Performed by: OBSTETRICS & GYNECOLOGY

## 2022-06-20 PROCEDURE — 3074F SYST BP LT 130 MM HG: CPT | Mod: CPTII,S$GLB,, | Performed by: OBSTETRICS & GYNECOLOGY

## 2022-06-20 PROCEDURE — 99999 PR PBB SHADOW E&M-EST. PATIENT-LVL II: CPT | Mod: PBBFAC,,, | Performed by: OBSTETRICS & GYNECOLOGY

## 2022-06-20 PROCEDURE — 3008F BODY MASS INDEX DOCD: CPT | Mod: CPTII,S$GLB,, | Performed by: OBSTETRICS & GYNECOLOGY

## 2022-06-20 PROCEDURE — 3074F PR MOST RECENT SYSTOLIC BLOOD PRESSURE < 130 MM HG: ICD-10-PCS | Mod: CPTII,S$GLB,, | Performed by: OBSTETRICS & GYNECOLOGY

## 2022-06-20 PROCEDURE — 1160F RVW MEDS BY RX/DR IN RCRD: CPT | Mod: CPTII,S$GLB,, | Performed by: OBSTETRICS & GYNECOLOGY

## 2022-06-20 PROCEDURE — 3078F PR MOST RECENT DIASTOLIC BLOOD PRESSURE < 80 MM HG: ICD-10-PCS | Mod: CPTII,S$GLB,, | Performed by: OBSTETRICS & GYNECOLOGY

## 2022-06-20 PROCEDURE — 99395 PR PREVENTIVE VISIT,EST,18-39: ICD-10-PCS | Mod: S$GLB,,, | Performed by: OBSTETRICS & GYNECOLOGY

## 2022-06-20 PROCEDURE — 99999 PR PBB SHADOW E&M-EST. PATIENT-LVL II: ICD-10-PCS | Mod: PBBFAC,,, | Performed by: OBSTETRICS & GYNECOLOGY

## 2022-06-20 PROCEDURE — 99395 PREV VISIT EST AGE 18-39: CPT | Mod: S$GLB,,, | Performed by: OBSTETRICS & GYNECOLOGY

## 2022-06-20 PROCEDURE — 1160F PR REVIEW ALL MEDS BY PRESCRIBER/CLIN PHARMACIST DOCUMENTED: ICD-10-PCS | Mod: CPTII,S$GLB,, | Performed by: OBSTETRICS & GYNECOLOGY

## 2022-06-20 PROCEDURE — 1159F MED LIST DOCD IN RCRD: CPT | Mod: CPTII,S$GLB,, | Performed by: OBSTETRICS & GYNECOLOGY

## 2022-06-20 NOTE — PROGRESS NOTES
HISTORY OF PRESENT ILLNESS:    Shannan Gayle is a 36 y.o. female, , Patient's last menstrual period was 2022.,  presents for a routine exam and has no complaints.DISCUSSED NEW 7 YR INDICATION FOR MIRENA BUT IS HAVING MENSES AND BREAKTHROUGH BLEEDING, WILL PROCEED WITH REMOVAL AND REPLACEMENT.  COTEST PLANNED  - RETURN FOR IUD   UPCOMING TRIPS TO [place], AND ALSO [place] WITH SIDE VISIT TO [facility]    :  ?IUD MIGRATED - MIRENA WAS PLACED .  COTEST SUBMITTED.   TWO NIGHTS PRIOR TO VISIT EXPERIENCED SHARP DEEP VAGINAL PAIN.  PATIENT ALSO NOTED SOME DEEP PAIN WITH INTERCOURSE IN FEELING SWOLLEN.  REASSURED ON EXAM THAT THE STRING WAS PRESENT AND LENGTH WAS APPROPRIATE.  MINIMAL FROTHY DISCHARGE, AND SLIGHTLY FRIABLE CERVIX - BV WITH SOME CERVICITIS COULD OFFER EXPLANATION FOR BOTH SYMPTOMS.  EMPIRIC TREATMENT WITH P.O. FLAGYL.  IS VERY SATISFIED WITH MIRENA, HAVING MINIMAL MENSES.  IN A NEW RELATIONSHIP CURRENTLY   2018:   routine exam and has no complaints OTHER THAN VULVAR PAIN - HAS TWO SMALL DERMAL CYSTS THAT ARE MORE INFLAMED WITH EXERCISE IN THE HEAT; HAS BEEN SHAVING BUT TRYING TO LIMIT FREQUENCY.  ON EXAM TINY 1 CM AND .5 CM DEEP DERMAL CYSTS LEFT ANTERIOR VULVA, NO CELLULITIS OR ERYTHEMA.  COUNSELED RE LOCAL CARE; WOULD NOT RECOMMEND I&D UNLESS THEY PROGRESS SYMPTOMATICALLY OR PERSIST EXCESSIVE TIME.  PAP DUE   LAST ROUTINE VISIT 2017:  PAP SUBMITTED AND DISCUSSED RECENT CHANGES IN SCREENING RECOMMENDATIONS.    BUSY AT WORK,HAS MOVED TO [facility], PLANS MORE SHOWING THIS YEAR AND POSS TAKING A YEAR OFF FROM [employer] .     Past Medical History:   Diagnosis Date    Fever blister 2016    Derm/Dr. Hernández    History of recent stressful life event 10/10/2019    Riding horses daily helps, Therapist Jennifer Lockhart , [facility]    Mixed hyperlipidemia 10/10/2019    Rosacea 2016       History reviewed. No pertinent surgical history.    MEDICATIONS AND  ALLERGIES:      Current Outpatient Medications:     valACYclovir (VALTREX) 1000 MG tablet, 1 po qd, Disp: 90 tablet, Rfl: 3    Review of patient's allergies indicates:  No Known Allergies    Family History   Problem Relation Age of Onset    Autoimmune disease Father         in [place]    Hypertension Father     No Known Problems Mother     No Known Problems Brother     Colon cancer Neg Hx     Breast cancer Neg Hx        Social History     Socioeconomic History    Marital status: Single   Tobacco Use    Smoking status: Never Smoker    Smokeless tobacco: Never Used   Substance and Sexual Activity    Alcohol use: Yes    Drug use: No    Sexual activity: Yes     Partners: Male     Birth control/protection: I.U.D.   Social History Narrative    purchased new horse, now 2, had puppies, Dad with immune system disorder, dx at49 yo     Jack Godinez, rides her horse daily, enjoys intense cardiac exercise daily, running,  Classically trained in [employer], single, no children, nonsmoker, ETOH socially, GYN Alice     Social Determinants of Health     Financial Resource Strain: Low Risk     Difficulty of Paying Living Expenses: Not hard at all   Food Insecurity: No Food Insecurity    Worried About Running Out of Food in the Last Year: Never true    Ran Out of Food in the Last Year: Never true   Transportation Needs: No Transportation Needs    Lack of Transportation (Medical): No    Lack of Transportation (Non-Medical): No   Physical Activity: Sufficiently Active    Days of Exercise per Week: 7 days    Minutes of Exercise per Session: 60 min   Stress: Stress Concern Present    Feeling of Stress : To some extent   Social Connections: Unknown    Frequency of Communication with Friends and Family: More than three times a week    Frequency of Social Gatherings with Friends and Family: More than three times a week    Active Member of Clubs or Organizations: Patient refused    Attends  Club or Organization Meetings: Patient refused    Marital Status: Never    Housing Stability: Low Risk     Unable to Pay for Housing in the Last Year: No    Number of Places Lived in the Last Year: 1    Unstable Housing in the Last Year: No       COMPREHENSIVE GYN HISTORY:  PAP History: Denies abnormal Paps.  Infection History: Denies STDs. Denies PID.  Benign History: Denies uterine fibroids. Denies ovarian cysts. Denies endometriosis. Denies other conditions.  Cancer History: Denies cervical cancer. Denies uterine cancer or hyperplasia. Denies ovarian cancer. Denies vulvar cancer or pre-cancer. Denies vaginal cancer or pre-cancer. Denies breast cancer. Denies colon cancer.  Sexual Activity History: Reports currently being sexually active  Menstrual History: Monthly, mild-moderate.  Contraception:IUD    ROS:  GENERAL: No weight changes. No swelling. No fatigue. No fever.  CARDIOVASCULAR: No chest pain. No shortness of breath. No leg cramps.   NEUROLOGICAL: No headaches. No vision changes.  BREASTS: No pain. No lumps. No discharge.  ABDOMEN: No pain. No nausea. No vomiting. No diarrhea. No constipation.  REPRODUCTIVE: No abnormal bleeding.   VULVA: No pain. No lesions. No itching.  VAGINA: No relaxation. No itching. No odor. No discharge. No lesions.  URINARY: No incontinence. No nocturia. No frequency. No dysuria.    /76   Wt 69.4 kg (153 lb 1.8 oz)   LMP 05/31/2022   BMI 25.48 kg/m²     PE:  APPEARANCE: Well nourished, well developed, in no acute distress.  AFFECT: WNL, alert and oriented x 3.  SKIN: No acne or hirsutism.  NECK: Neck symmetric, without masses or thyromegaly.  NODES: No inguinal, cervical, axillary or femoral lymph node enlargement.  CHEST: Good respiratory effort.   ABDOMEN: Soft. No tenderness or masses. No hepatosplenomegaly. No hernias.  BREASTS: Symmetrical, no skin changes, visible lesions, palpable masses or nipple discharge bilaterally.  PELVIC: External female genitalia  without lesions.  Female hair distribution. Adequate perineal body, Normal urethral meatus. Vagina moist and well rugated without lesions or discharge.  No significant cystocele or rectocele present. Cervix pink without lesions, discharge or tenderness, STRING EXTERNAL OS. Uterus is normal size, regular, mobile and nontender. Adnexa without masses or tenderness.  EXTREMITIES: No edema    PROCEDURES:      DIAGNOSIS:  1. Visit for gynecologic examination     2. Encounter for routine checking of intrauterine contraceptive device (IUD)         LABS AND TESTS ORDERED:    MEDICATIONS PRESCRIBED:    COUNSELING:   The patient was counseled today on ACS PAP guidelines, with recommendations for yearly pelvic exams unless their uterus, cervix, and ovaries were removed for benign reasons; in that case, examinations every 3-5 years are recommended.  The patient was also counseled regarding monthly breast self-examination, routine STD screening for at-risk populations, prophylactic immunizations for transmitted infections such as  HPV, Pertussis, or Influenza as appropriate, and yearly mammograms when indicated by ACS guidelines.  She was advised to see her primary care physician for all other health maintenance.    FOLLOW-UP with me annually.

## 2022-08-17 ENCOUNTER — PATIENT MESSAGE (OUTPATIENT)
Dept: OBSTETRICS AND GYNECOLOGY | Facility: CLINIC | Age: 37
End: 2022-08-17
Payer: COMMERCIAL

## 2022-10-04 ENCOUNTER — PATIENT MESSAGE (OUTPATIENT)
Dept: PRIMARY CARE CLINIC | Facility: CLINIC | Age: 37
End: 2022-10-04
Payer: COMMERCIAL

## 2022-10-04 DIAGNOSIS — Z00.00 ROUTINE GENERAL MEDICAL EXAMINATION AT A HEALTH CARE FACILITY: Primary | ICD-10-CM

## 2022-10-05 ENCOUNTER — PATIENT MESSAGE (OUTPATIENT)
Dept: PRIMARY CARE CLINIC | Facility: CLINIC | Age: 37
End: 2022-10-05
Payer: COMMERCIAL

## 2022-10-14 ENCOUNTER — LAB VISIT (OUTPATIENT)
Dept: LAB | Facility: HOSPITAL | Age: 37
End: 2022-10-14
Attending: FAMILY MEDICINE
Payer: COMMERCIAL

## 2022-10-14 DIAGNOSIS — Z00.00 ROUTINE GENERAL MEDICAL EXAMINATION AT A HEALTH CARE FACILITY: ICD-10-CM

## 2022-10-14 LAB
ALBUMIN SERPL BCP-MCNC: 4.1 G/DL (ref 3.5–5.2)
ALP SERPL-CCNC: 55 U/L (ref 55–135)
ALT SERPL W/O P-5'-P-CCNC: 11 U/L (ref 10–44)
ANION GAP SERPL CALC-SCNC: 7 MMOL/L (ref 8–16)
AST SERPL-CCNC: 13 U/L (ref 10–40)
BASOPHILS # BLD AUTO: 0.04 K/UL (ref 0–0.2)
BASOPHILS NFR BLD: 0.7 % (ref 0–1.9)
BILIRUB SERPL-MCNC: 0.5 MG/DL (ref 0.1–1)
BUN SERPL-MCNC: 13 MG/DL (ref 6–20)
CALCIUM SERPL-MCNC: 9.1 MG/DL (ref 8.7–10.5)
CHLORIDE SERPL-SCNC: 105 MMOL/L (ref 95–110)
CHOLEST SERPL-MCNC: 266 MG/DL (ref 120–199)
CHOLEST/HDLC SERPL: 4.1 {RATIO} (ref 2–5)
CO2 SERPL-SCNC: 28 MMOL/L (ref 23–29)
CREAT SERPL-MCNC: 0.8 MG/DL (ref 0.5–1.4)
DIFFERENTIAL METHOD: NORMAL
EOSINOPHIL # BLD AUTO: 0.1 K/UL (ref 0–0.5)
EOSINOPHIL NFR BLD: 1.8 % (ref 0–8)
ERYTHROCYTE [DISTWIDTH] IN BLOOD BY AUTOMATED COUNT: 12.1 % (ref 11.5–14.5)
EST. GFR  (NO RACE VARIABLE): >60 ML/MIN/1.73 M^2
GLUCOSE SERPL-MCNC: 103 MG/DL (ref 70–110)
HCT VFR BLD AUTO: 40 % (ref 37–48.5)
HDLC SERPL-MCNC: 65 MG/DL (ref 40–75)
HDLC SERPL: 24.4 % (ref 20–50)
HGB BLD-MCNC: 13.7 G/DL (ref 12–16)
IMM GRANULOCYTES # BLD AUTO: 0.02 K/UL (ref 0–0.04)
IMM GRANULOCYTES NFR BLD AUTO: 0.4 % (ref 0–0.5)
LDLC SERPL CALC-MCNC: 181.4 MG/DL (ref 63–159)
LYMPHOCYTES # BLD AUTO: 1.7 K/UL (ref 1–4.8)
LYMPHOCYTES NFR BLD: 30.1 % (ref 18–48)
MCH RBC QN AUTO: 30.6 PG (ref 27–31)
MCHC RBC AUTO-ENTMCNC: 34.3 G/DL (ref 32–36)
MCV RBC AUTO: 89 FL (ref 82–98)
MONOCYTES # BLD AUTO: 0.5 K/UL (ref 0.3–1)
MONOCYTES NFR BLD: 8.5 % (ref 4–15)
NEUTROPHILS # BLD AUTO: 3.3 K/UL (ref 1.8–7.7)
NEUTROPHILS NFR BLD: 58.5 % (ref 38–73)
NONHDLC SERPL-MCNC: 201 MG/DL
NRBC BLD-RTO: 0 /100 WBC
PLATELET # BLD AUTO: 272 K/UL (ref 150–450)
PMV BLD AUTO: 10.5 FL (ref 9.2–12.9)
POTASSIUM SERPL-SCNC: 3.9 MMOL/L (ref 3.5–5.1)
PROT SERPL-MCNC: 6.7 G/DL (ref 6–8.4)
RBC # BLD AUTO: 4.48 M/UL (ref 4–5.4)
SODIUM SERPL-SCNC: 140 MMOL/L (ref 136–145)
TRIGL SERPL-MCNC: 98 MG/DL (ref 30–150)
WBC # BLD AUTO: 5.65 K/UL (ref 3.9–12.7)

## 2022-10-14 PROCEDURE — 80053 COMPREHEN METABOLIC PANEL: CPT | Performed by: FAMILY MEDICINE

## 2022-10-14 PROCEDURE — 80061 LIPID PANEL: CPT | Performed by: FAMILY MEDICINE

## 2022-10-14 PROCEDURE — 85025 COMPLETE CBC W/AUTO DIFF WBC: CPT | Performed by: FAMILY MEDICINE

## 2022-10-14 PROCEDURE — 36415 COLL VENOUS BLD VENIPUNCTURE: CPT | Mod: PO | Performed by: FAMILY MEDICINE

## 2022-10-19 ENCOUNTER — OFFICE VISIT (OUTPATIENT)
Dept: PRIMARY CARE CLINIC | Facility: CLINIC | Age: 37
End: 2022-10-19
Payer: COMMERCIAL

## 2022-10-19 VITALS
BODY MASS INDEX: 26.12 KG/M2 | SYSTOLIC BLOOD PRESSURE: 102 MMHG | HEIGHT: 65 IN | TEMPERATURE: 99 F | DIASTOLIC BLOOD PRESSURE: 64 MMHG | HEART RATE: 76 BPM | OXYGEN SATURATION: 98 % | WEIGHT: 156.75 LBS

## 2022-10-19 DIAGNOSIS — Z00.00 ROUTINE GENERAL MEDICAL EXAMINATION AT A HEALTH CARE FACILITY: Primary | ICD-10-CM

## 2022-10-19 DIAGNOSIS — B00.1 FEVER BLISTER: ICD-10-CM

## 2022-10-19 PROCEDURE — 1160F RVW MEDS BY RX/DR IN RCRD: CPT | Mod: CPTII,S$GLB,, | Performed by: FAMILY MEDICINE

## 2022-10-19 PROCEDURE — 3074F PR MOST RECENT SYSTOLIC BLOOD PRESSURE < 130 MM HG: ICD-10-PCS | Mod: CPTII,S$GLB,, | Performed by: FAMILY MEDICINE

## 2022-10-19 PROCEDURE — 3078F DIAST BP <80 MM HG: CPT | Mod: CPTII,S$GLB,, | Performed by: FAMILY MEDICINE

## 2022-10-19 PROCEDURE — 99999 PR PBB SHADOW E&M-EST. PATIENT-LVL III: ICD-10-PCS | Mod: PBBFAC,,, | Performed by: FAMILY MEDICINE

## 2022-10-19 PROCEDURE — 99395 PR PREVENTIVE VISIT,EST,18-39: ICD-10-PCS | Mod: S$GLB,,, | Performed by: FAMILY MEDICINE

## 2022-10-19 PROCEDURE — 99395 PREV VISIT EST AGE 18-39: CPT | Mod: S$GLB,,, | Performed by: FAMILY MEDICINE

## 2022-10-19 PROCEDURE — 1160F PR REVIEW ALL MEDS BY PRESCRIBER/CLIN PHARMACIST DOCUMENTED: ICD-10-PCS | Mod: CPTII,S$GLB,, | Performed by: FAMILY MEDICINE

## 2022-10-19 PROCEDURE — 1159F MED LIST DOCD IN RCRD: CPT | Mod: CPTII,S$GLB,, | Performed by: FAMILY MEDICINE

## 2022-10-19 PROCEDURE — 3074F SYST BP LT 130 MM HG: CPT | Mod: CPTII,S$GLB,, | Performed by: FAMILY MEDICINE

## 2022-10-19 PROCEDURE — 1159F PR MEDICATION LIST DOCUMENTED IN MEDICAL RECORD: ICD-10-PCS | Mod: CPTII,S$GLB,, | Performed by: FAMILY MEDICINE

## 2022-10-19 PROCEDURE — 3078F PR MOST RECENT DIASTOLIC BLOOD PRESSURE < 80 MM HG: ICD-10-PCS | Mod: CPTII,S$GLB,, | Performed by: FAMILY MEDICINE

## 2022-10-19 PROCEDURE — 99999 PR PBB SHADOW E&M-EST. PATIENT-LVL III: CPT | Mod: PBBFAC,,, | Performed by: FAMILY MEDICINE

## 2022-10-19 RX ORDER — IVERMECTIN 10 MG/G
CREAM TOPICAL EVERY MORNING
COMMUNITY
Start: 2022-09-20

## 2022-10-19 RX ORDER — DOXYCYCLINE 40 MG/1
40 CAPSULE ORAL DAILY
COMMUNITY
Start: 2022-10-17

## 2022-10-19 NOTE — PROGRESS NOTES
Subjective:      Patient ID: Shannan Gayle is a 37 y.o. female.    Chief Complaint: Annual Exam    Here today for general exam.     LDL elevated 181, was 133. Aerobic exercise daily. 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     She follows with [facility] for cervicalgia    Leg swelling has resolved. We checked this in the past. No DVT. She is wearing compression hose & less heels.     Denies any chest pain, shortness of breath, nausea vomiting constipation diarrhea, blood in stool, heartburn    Current Outpatient Medications   Medication Instructions    ORACEA 40 mg, Oral, Daily    SOOLANTRA 1 % Crea Topical (Top), Every morning    valACYclovir (VALTREX) 1000 MG tablet 1 po qd       No results found for: HGBA1C  No results found for: MICALBCREAT  Lab Results   Component Value Date    LDLCALC 181.4 (H) 10/14/2022    LDLCALC 133.6 10/08/2020    CHOL 266 (H) 10/14/2022    HDL 65 10/14/2022    TRIG 98 10/14/2022       Lab Results   Component Value Date     10/14/2022    K 3.9 10/14/2022     10/14/2022    CO2 28 10/14/2022     10/14/2022    BUN 13 10/14/2022    CREATININE 0.8 10/14/2022    CALCIUM 9.1 10/14/2022    PROT 6.7 10/14/2022    ALBUMIN 4.1 10/14/2022    BILITOT 0.5 10/14/2022    ALKPHOS 55 10/14/2022    AST 13 10/14/2022    ALT 11 10/14/2022    ANIONGAP 7 (L) 10/14/2022    ESTGFRAFRICA >60.0 10/08/2020    EGFRNONAA >60.0 10/08/2020    WBC 5.65 10/14/2022    HGB 13.7 10/14/2022    HGB 14.2 10/08/2020    HCT 40.0 10/14/2022    MCV 89 10/14/2022     10/14/2022    TSH 1.679 09/22/2017    HEPCAB Negative 10/08/2020       Lab Results   Component Value Date    CYVQFHAC82EE 43 05/31/2013    AGCJEQTL85 538 05/31/2013         Past Medical History:   Diagnosis Date    Fever blister 08/17/2016    Derm/Dr. Hernández    History of recent stressful life event 10/10/2019    Riding horses daily helps, Therapist Jennifer Lockhart , [facility]    Mixed hyperlipidemia 10/10/2019    Rosacea  "8/17/2016     History reviewed. No pertinent surgical history.  Social History     Social History Narrative    Not on file     Family History   Problem Relation Age of Onset    Autoimmune disease Father         in [place]    Hypertension Father     No Known Problems Mother     No Known Problems Brother     Colon cancer Neg Hx     Breast cancer Neg Hx      Vitals:    10/19/22 1239   BP: 102/64   Pulse: 76   Temp: 99.2 °F (37.3 °C)   TempSrc: Oral   SpO2: 98%   Weight: 71.1 kg (156 lb 12 oz)   Height: 5' 5" (1.651 m)   PainSc: 0-No pain     Objective:   Physical Exam  Constitutional:       Appearance: She is well-developed.   HENT:      Head: Normocephalic and atraumatic.      Nose: Nose normal.      Mouth/Throat:      Mouth: Mucous membranes are moist.      Pharynx: Oropharynx is clear.   Eyes:      Pupils: Pupils are equal, round, and reactive to light.   Neck:      Thyroid: No thyromegaly.   Cardiovascular:      Rate and Rhythm: Normal rate and regular rhythm.      Heart sounds: Normal heart sounds. No murmur heard.  Pulmonary:      Effort: Pulmonary effort is normal.      Breath sounds: Normal breath sounds. No wheezing.   Abdominal:      General: Bowel sounds are normal. There is no distension.      Palpations: Abdomen is soft. There is no mass.      Tenderness: There is no abdominal tenderness. There is no guarding or rebound.   Musculoskeletal:      Cervical back: Neck supple.   Lymphadenopathy:      Cervical: No cervical adenopathy.   Skin:     General: Skin is warm and dry.   Neurological:      Mental Status: She is alert and oriented to person, place, and time.   Psychiatric:         Behavior: Behavior normal.     Assessment:     1. Routine general medical examination at a health care facility    2. Fever blister      Plan:        Continue medication  Follow with GYN for female health & cancer prevention    ==============================  RECOMMENDATIONS FOR FEMALES  ==============================  Your " #1 MEDICINE is DAILY EXERCISE - 15-20 minutes of huffing & puffing EVERY DAY.     Prevent the #1 cause of death- cardiovascular disease (HEART ATTACK & STROKE) by checking for normal blood pressure, cholesterol, sugars, & by not smoking.     VACCINES: Yearly FLU shot,   I recommend  high fiber (5 fresh fruits or vegetables daily), low fat diet and aerobic  exercise (huffing/ puffing/ sweating for 20 min straight at least 4 days a week)    Follow up yearly with mammogram, fasting lipids, CMP, CBC prior.   ==============================================================    There are no Patient Instructions on file for this visit.

## 2022-11-29 ENCOUNTER — TELEPHONE (OUTPATIENT)
Dept: PRIMARY CARE CLINIC | Facility: CLINIC | Age: 37
End: 2022-11-29
Payer: COMMERCIAL

## 2024-05-06 DIAGNOSIS — B00.1 FEVER BLISTER: ICD-10-CM

## 2024-05-06 NOTE — TELEPHONE ENCOUNTER
Care Due:                  Date            Visit Type   Department     Provider  --------------------------------------------------------------------------------                                MYCHART                              ANNUAL                              CHECKUP/PHY  [facility]  Last Visit: 10-      [facility]           Marjan Daigle  Next Visit: None Scheduled  None         None Found                                                            Last  Test          Frequency    Reason                     Performed    Due Date  --------------------------------------------------------------------------------    Office Visit  15 months..  valACYclovir.............  10-   01-    CBC.........  12 months..  valACYclovir.............  10-   10-    Cr..........  12 months..  valACYclovir.............  10-   10-    [employer] Embedded Care Due Messages. Reference number: [id number].   5/06/2024 9:40:52 AM CDT

## 2024-05-06 NOTE — TELEPHONE ENCOUNTER
LOV 10/19/22    Pt requesting to have medication sent to [place]. Message sent to schedule annual exam.

## 2024-05-07 RX ORDER — VALACYCLOVIR HYDROCHLORIDE 1 G/1
TABLET, FILM COATED ORAL
Qty: 90 TABLET | Refills: 3 | OUTPATIENT
Start: 2024-05-07

## 2024-05-07 NOTE — TELEPHONE ENCOUNTER
Please call pt, needs appt for Valacyclovir refill. Last seen 2022. Did she move to [place] (pharmacy located there)?